# Patient Record
Sex: MALE | Race: WHITE | Employment: OTHER | URBAN - METROPOLITAN AREA
[De-identification: names, ages, dates, MRNs, and addresses within clinical notes are randomized per-mention and may not be internally consistent; named-entity substitution may affect disease eponyms.]

---

## 2019-01-21 ENCOUNTER — HOSPITAL ENCOUNTER (EMERGENCY)
Age: 69
Discharge: HOME | End: 2019-01-21
Attending: EMERGENCY MEDICINE
Payer: MEDICARE

## 2019-01-21 ENCOUNTER — APPOINTMENT (OUTPATIENT)
Dept: CT IMAGING | Age: 69
End: 2019-01-21
Payer: MEDICARE

## 2019-01-21 VITALS
DIASTOLIC BLOOD PRESSURE: 83 MMHG | TEMPERATURE: 97.6 F | HEIGHT: 72 IN | WEIGHT: 258.1 LBS | HEART RATE: 73 BPM | RESPIRATION RATE: 18 BRPM | SYSTOLIC BLOOD PRESSURE: 153 MMHG | OXYGEN SATURATION: 97 % | BODY MASS INDEX: 34.96 KG/M2

## 2019-01-21 DIAGNOSIS — R10.9 LEFT FLANK PAIN: ICD-10-CM

## 2019-01-21 DIAGNOSIS — N20.1 CALCULUS OF URETER: Primary | ICD-10-CM

## 2019-01-21 LAB
ALBUMIN SERPL-MCNC: 4.5 G/DL (ref 3.5–5.2)
ALP BLD-CCNC: 56 U/L (ref 40–129)
ALT SERPL-CCNC: 33 U/L (ref 0–40)
ANION GAP SERPL CALCULATED.3IONS-SCNC: 14 MMOL/L (ref 7–16)
AST SERPL-CCNC: 32 U/L (ref 0–39)
BASOPHILS ABSOLUTE: 0.06 E9/L (ref 0–0.2)
BASOPHILS RELATIVE PERCENT: 0.8 % (ref 0–2)
BILIRUB SERPL-MCNC: 0.5 MG/DL (ref 0–1.2)
BILIRUBIN URINE: NEGATIVE
BLOOD, URINE: NEGATIVE
BUN BLDV-MCNC: 22 MG/DL (ref 8–23)
CALCIUM SERPL-MCNC: 9.1 MG/DL (ref 8.6–10.2)
CHLORIDE BLD-SCNC: 105 MMOL/L (ref 98–107)
CLARITY: CLEAR
CO2: 20 MMOL/L (ref 22–29)
COLOR: YELLOW
CREAT SERPL-MCNC: 1.3 MG/DL (ref 0.7–1.2)
EKG ATRIAL RATE: 84 BPM
EKG P AXIS: 46 DEGREES
EKG P-R INTERVAL: 222 MS
EKG Q-T INTERVAL: 384 MS
EKG QRS DURATION: 106 MS
EKG QTC CALCULATION (BAZETT): 453 MS
EKG R AXIS: 16 DEGREES
EKG T AXIS: 39 DEGREES
EKG VENTRICULAR RATE: 84 BPM
EOSINOPHILS ABSOLUTE: 0.12 E9/L (ref 0.05–0.5)
EOSINOPHILS RELATIVE PERCENT: 1.6 % (ref 0–6)
GFR AFRICAN AMERICAN: >60
GFR NON-AFRICAN AMERICAN: 55 ML/MIN/1.73
GLUCOSE BLD-MCNC: 146 MG/DL (ref 74–99)
GLUCOSE URINE: NEGATIVE MG/DL
HCT VFR BLD CALC: 43.5 % (ref 37–54)
HEMOGLOBIN: 14.3 G/DL (ref 12.5–16.5)
IMMATURE GRANULOCYTES #: 0.04 E9/L
IMMATURE GRANULOCYTES %: 0.5 % (ref 0–5)
KETONES, URINE: NEGATIVE MG/DL
LACTIC ACID: 1.7 MMOL/L (ref 0.5–2.2)
LEUKOCYTE ESTERASE, URINE: NEGATIVE
LIPASE: 30 U/L (ref 13–60)
LYMPHOCYTES ABSOLUTE: 2.72 E9/L (ref 1.5–4)
LYMPHOCYTES RELATIVE PERCENT: 36.6 % (ref 20–42)
MCH RBC QN AUTO: 29.5 PG (ref 26–35)
MCHC RBC AUTO-ENTMCNC: 32.9 % (ref 32–34.5)
MCV RBC AUTO: 89.9 FL (ref 80–99.9)
MONOCYTES ABSOLUTE: 0.93 E9/L (ref 0.1–0.95)
MONOCYTES RELATIVE PERCENT: 12.5 % (ref 2–12)
NEUTROPHILS ABSOLUTE: 3.56 E9/L (ref 1.8–7.3)
NEUTROPHILS RELATIVE PERCENT: 48 % (ref 43–80)
NITRITE, URINE: NEGATIVE
PDW BLD-RTO: 13.8 FL (ref 11.5–15)
PH UA: 5.5 (ref 5–9)
PLATELET # BLD: 254 E9/L (ref 130–450)
PMV BLD AUTO: 8.9 FL (ref 7–12)
POTASSIUM SERPL-SCNC: 4.3 MMOL/L (ref 3.5–5)
PROTEIN UA: NEGATIVE MG/DL
RBC # BLD: 4.84 E12/L (ref 3.8–5.8)
SODIUM BLD-SCNC: 139 MMOL/L (ref 132–146)
SPECIFIC GRAVITY UA: >=1.03 (ref 1–1.03)
TOTAL PROTEIN: 7.2 G/DL (ref 6.4–8.3)
TROPONIN: <0.01 NG/ML (ref 0–0.03)
UROBILINOGEN, URINE: 0.2 E.U./DL
WBC # BLD: 7.4 E9/L (ref 4.5–11.5)

## 2019-01-21 PROCEDURE — S0028 INJECTION, FAMOTIDINE, 20 MG: HCPCS | Performed by: EMERGENCY MEDICINE

## 2019-01-21 PROCEDURE — 74176 CT ABD & PELVIS W/O CONTRAST: CPT

## 2019-01-21 PROCEDURE — 85025 COMPLETE CBC W/AUTO DIFF WBC: CPT

## 2019-01-21 PROCEDURE — 84484 ASSAY OF TROPONIN QUANT: CPT

## 2019-01-21 PROCEDURE — 80053 COMPREHEN METABOLIC PANEL: CPT

## 2019-01-21 PROCEDURE — 83605 ASSAY OF LACTIC ACID: CPT

## 2019-01-21 PROCEDURE — 99284 EMERGENCY DEPT VISIT MOD MDM: CPT

## 2019-01-21 PROCEDURE — 6370000000 HC RX 637 (ALT 250 FOR IP): Performed by: EMERGENCY MEDICINE

## 2019-01-21 PROCEDURE — 96375 TX/PRO/DX INJ NEW DRUG ADDON: CPT

## 2019-01-21 PROCEDURE — 83690 ASSAY OF LIPASE: CPT

## 2019-01-21 PROCEDURE — 96374 THER/PROPH/DIAG INJ IV PUSH: CPT

## 2019-01-21 PROCEDURE — 6360000002 HC RX W HCPCS: Performed by: EMERGENCY MEDICINE

## 2019-01-21 PROCEDURE — 2580000003 HC RX 258: Performed by: EMERGENCY MEDICINE

## 2019-01-21 PROCEDURE — 81003 URINALYSIS AUTO W/O SCOPE: CPT

## 2019-01-21 PROCEDURE — 93005 ELECTROCARDIOGRAM TRACING: CPT

## 2019-01-21 PROCEDURE — 36415 COLL VENOUS BLD VENIPUNCTURE: CPT

## 2019-01-21 RX ORDER — MORPHINE SULFATE 10 MG/ML
8 INJECTION, SOLUTION INTRAMUSCULAR; INTRAVENOUS ONCE
Status: COMPLETED | OUTPATIENT
Start: 2019-01-21 | End: 2019-01-21

## 2019-01-21 RX ORDER — ONDANSETRON 2 MG/ML
4 INJECTION INTRAMUSCULAR; INTRAVENOUS ONCE
Status: COMPLETED | OUTPATIENT
Start: 2019-01-21 | End: 2019-01-21

## 2019-01-21 RX ORDER — CLOPIDOGREL BISULFATE 75 MG/1
75 TABLET ORAL DAILY
COMMUNITY

## 2019-01-21 RX ORDER — OXYCODONE HYDROCHLORIDE AND ACETAMINOPHEN 5; 325 MG/1; MG/1
1 TABLET ORAL EVERY 6 HOURS PRN
Qty: 20 TABLET | Refills: 0 | Status: SHIPPED | OUTPATIENT
Start: 2019-01-21 | End: 2019-01-26

## 2019-01-21 RX ORDER — 0.9 % SODIUM CHLORIDE 0.9 %
500 INTRAVENOUS SOLUTION INTRAVENOUS ONCE
Status: COMPLETED | OUTPATIENT
Start: 2019-01-21 | End: 2019-01-21

## 2019-01-21 RX ORDER — TAMSULOSIN HYDROCHLORIDE 0.4 MG/1
0.4 CAPSULE ORAL DAILY
Qty: 14 CAPSULE | Refills: 0 | Status: SHIPPED | OUTPATIENT
Start: 2019-01-21 | End: 2019-03-02

## 2019-01-21 RX ORDER — OXYCODONE HYDROCHLORIDE AND ACETAMINOPHEN 5; 325 MG/1; MG/1
1 TABLET ORAL ONCE
Status: COMPLETED | OUTPATIENT
Start: 2019-01-21 | End: 2019-01-21

## 2019-01-21 RX ORDER — ONDANSETRON 4 MG/1
4 TABLET, ORALLY DISINTEGRATING ORAL EVERY 8 HOURS PRN
Qty: 10 TABLET | Refills: 0 | Status: SHIPPED | OUTPATIENT
Start: 2019-01-21 | End: 2019-03-02

## 2019-01-21 RX ORDER — KETOROLAC TROMETHAMINE 15 MG/ML
15 INJECTION, SOLUTION INTRAMUSCULAR; INTRAVENOUS ONCE
Status: COMPLETED | OUTPATIENT
Start: 2019-01-21 | End: 2019-01-21

## 2019-01-21 RX ORDER — NAPROXEN 500 MG/1
500 TABLET ORAL 2 TIMES DAILY PRN
Qty: 28 TABLET | Refills: 0 | Status: SHIPPED | OUTPATIENT
Start: 2019-01-21 | End: 2019-03-02

## 2019-01-21 RX ORDER — TAMSULOSIN HYDROCHLORIDE 0.4 MG/1
0.4 CAPSULE ORAL ONCE
Status: COMPLETED | OUTPATIENT
Start: 2019-01-21 | End: 2019-01-21

## 2019-01-21 RX ADMIN — FAMOTIDINE 20 MG: 10 INJECTION, SOLUTION INTRAVENOUS at 01:38

## 2019-01-21 RX ADMIN — OXYCODONE AND ACETAMINOPHEN 1 TABLET: 5; 325 TABLET ORAL at 03:38

## 2019-01-21 RX ADMIN — TAMSULOSIN HYDROCHLORIDE 0.4 MG: 0.4 CAPSULE ORAL at 03:38

## 2019-01-21 RX ADMIN — MORPHINE SULFATE: 10 INJECTION INTRAVENOUS at 01:36

## 2019-01-21 RX ADMIN — KETOROLAC TROMETHAMINE 15 MG: 15 INJECTION, SOLUTION INTRAMUSCULAR; INTRAVENOUS at 01:30

## 2019-01-21 RX ADMIN — ONDANSETRON 4 MG: 2 INJECTION INTRAMUSCULAR; INTRAVENOUS at 01:33

## 2019-01-21 RX ADMIN — SODIUM CHLORIDE 500 ML: 9 INJECTION, SOLUTION INTRAVENOUS at 01:33

## 2019-01-21 ASSESSMENT — PAIN DESCRIPTION - LOCATION
LOCATION: ABDOMEN
LOCATION: ABDOMEN

## 2019-01-21 ASSESSMENT — PAIN DESCRIPTION - PAIN TYPE
TYPE: ACUTE PAIN
TYPE: ACUTE PAIN

## 2019-01-21 ASSESSMENT — PAIN DESCRIPTION - ORIENTATION
ORIENTATION: MID;LOWER
ORIENTATION: MID

## 2019-01-21 ASSESSMENT — PAIN DESCRIPTION - ONSET: ONSET: AWAKENED FROM SLEEP

## 2019-01-21 ASSESSMENT — PAIN DESCRIPTION - FREQUENCY: FREQUENCY: CONTINUOUS

## 2019-01-21 ASSESSMENT — PAIN DESCRIPTION - DESCRIPTORS: DESCRIPTORS: STABBING

## 2019-01-21 ASSESSMENT — PAIN SCALES - GENERAL
PAINLEVEL_OUTOF10: 5
PAINLEVEL_OUTOF10: 10
PAINLEVEL_OUTOF10: 7
PAINLEVEL_OUTOF10: 10
PAINLEVEL_OUTOF10: 10

## 2019-03-02 ENCOUNTER — HOSPITAL ENCOUNTER (INPATIENT)
Age: 69
LOS: 1 days | Discharge: ANOTHER HOSPITAL | DRG: 313 | End: 2019-03-03
Attending: EMERGENCY MEDICINE | Admitting: INTERNAL MEDICINE
Payer: MEDICARE

## 2019-03-02 ENCOUNTER — APPOINTMENT (OUTPATIENT)
Dept: GENERAL RADIOLOGY | Age: 69
DRG: 313 | End: 2019-03-02
Payer: MEDICARE

## 2019-03-02 DIAGNOSIS — I10 HYPERTENSION, UNSPECIFIED TYPE: ICD-10-CM

## 2019-03-02 DIAGNOSIS — R07.9 CHEST PAIN, UNSPECIFIED TYPE: Primary | ICD-10-CM

## 2019-03-02 LAB
ANION GAP SERPL CALCULATED.3IONS-SCNC: 13 MMOL/L (ref 7–16)
BUN BLDV-MCNC: 27 MG/DL (ref 8–23)
CALCIUM SERPL-MCNC: 10.7 MG/DL (ref 8.6–10.2)
CHLORIDE BLD-SCNC: 106 MMOL/L (ref 98–107)
CHP ED QC CHECK: YES
CO2: 25 MMOL/L (ref 22–29)
CREAT SERPL-MCNC: 1.3 MG/DL (ref 0.7–1.2)
GFR AFRICAN AMERICAN: >60
GFR NON-AFRICAN AMERICAN: 55 ML/MIN/1.73
GLUCOSE BLD-MCNC: 101 MG/DL
GLUCOSE BLD-MCNC: 98 MG/DL (ref 74–99)
HCT VFR BLD CALC: 40.8 % (ref 37–54)
HEMOGLOBIN: 13.4 G/DL (ref 12.5–16.5)
MCH RBC QN AUTO: 29.8 PG (ref 26–35)
MCHC RBC AUTO-ENTMCNC: 32.8 % (ref 32–34.5)
MCV RBC AUTO: 90.9 FL (ref 80–99.9)
METER GLUCOSE: 101 MG/DL (ref 74–99)
PDW BLD-RTO: 13.5 FL (ref 11.5–15)
PLATELET # BLD: 252 E9/L (ref 130–450)
PMV BLD AUTO: 9 FL (ref 7–12)
POTASSIUM SERPL-SCNC: 4.2 MMOL/L (ref 3.5–5)
RBC # BLD: 4.49 E12/L (ref 3.8–5.8)
SODIUM BLD-SCNC: 144 MMOL/L (ref 132–146)
TROPONIN: 0.02 NG/ML (ref 0–0.03)
TROPONIN: 0.05 NG/ML (ref 0–0.03)
WBC # BLD: 7.1 E9/L (ref 4.5–11.5)

## 2019-03-02 PROCEDURE — 84484 ASSAY OF TROPONIN QUANT: CPT

## 2019-03-02 PROCEDURE — 71045 X-RAY EXAM CHEST 1 VIEW: CPT

## 2019-03-02 PROCEDURE — 85027 COMPLETE CBC AUTOMATED: CPT

## 2019-03-02 PROCEDURE — 6370000000 HC RX 637 (ALT 250 FOR IP): Performed by: EMERGENCY MEDICINE

## 2019-03-02 PROCEDURE — 6360000002 HC RX W HCPCS: Performed by: EMERGENCY MEDICINE

## 2019-03-02 PROCEDURE — 96372 THER/PROPH/DIAG INJ SC/IM: CPT

## 2019-03-02 PROCEDURE — 99285 EMERGENCY DEPT VISIT HI MDM: CPT

## 2019-03-02 PROCEDURE — 93005 ELECTROCARDIOGRAM TRACING: CPT | Performed by: EMERGENCY MEDICINE

## 2019-03-02 PROCEDURE — 36415 COLL VENOUS BLD VENIPUNCTURE: CPT

## 2019-03-02 PROCEDURE — 80048 BASIC METABOLIC PNL TOTAL CA: CPT

## 2019-03-02 PROCEDURE — 82962 GLUCOSE BLOOD TEST: CPT

## 2019-03-02 RX ORDER — ASPIRIN 81 MG/1
324 TABLET, CHEWABLE ORAL ONCE
Status: COMPLETED | OUTPATIENT
Start: 2019-03-02 | End: 2019-03-02

## 2019-03-02 RX ORDER — NITROGLYCERIN 0.4 MG/1
0.4 TABLET SUBLINGUAL ONCE
Status: COMPLETED | OUTPATIENT
Start: 2019-03-02 | End: 2019-03-02

## 2019-03-02 RX ORDER — GLYBURIDE 2.5 MG/1
2.5 TABLET ORAL
COMMUNITY
End: 2021-12-20

## 2019-03-02 RX ORDER — ATORVASTATIN CALCIUM 10 MG/1
10 TABLET, FILM COATED ORAL NIGHTLY
Status: DISCONTINUED | OUTPATIENT
Start: 2019-03-02 | End: 2019-03-03 | Stop reason: SDUPTHER

## 2019-03-02 RX ORDER — EZETIMIBE 10 MG/1
10 TABLET ORAL DAILY
COMMUNITY

## 2019-03-02 RX ADMIN — METFORMIN HYDROCHLORIDE 500 MG: 500 TABLET ORAL at 22:47

## 2019-03-02 RX ADMIN — NITROGLYCERIN 1 INCH: 20 OINTMENT TOPICAL at 22:14

## 2019-03-02 RX ADMIN — ENOXAPARIN SODIUM 120 MG: 120 INJECTION SUBCUTANEOUS at 23:18

## 2019-03-02 RX ADMIN — NITROGLYCERIN 0.4 MG: 0.4 TABLET SUBLINGUAL at 18:59

## 2019-03-02 RX ADMIN — ASPIRIN 81 MG 324 MG: 81 TABLET ORAL at 18:59

## 2019-03-02 RX ADMIN — ATORVASTATIN CALCIUM 10 MG: 10 TABLET, FILM COATED ORAL at 22:48

## 2019-03-02 ASSESSMENT — ENCOUNTER SYMPTOMS
ABDOMINAL PAIN: 0
COUGH: 0
SHORTNESS OF BREATH: 1
NAUSEA: 0
VOMITING: 0
WHEEZING: 0
BACK PAIN: 0
EYES NEGATIVE: 1
CHEST TIGHTNESS: 0

## 2019-03-02 ASSESSMENT — PAIN DESCRIPTION - LOCATION: LOCATION: CHEST

## 2019-03-02 ASSESSMENT — PAIN DESCRIPTION - PAIN TYPE: TYPE: ACUTE PAIN

## 2019-03-02 ASSESSMENT — PAIN SCALES - WONG BAKER: WONGBAKER_NUMERICALRESPONSE: 2

## 2019-03-02 ASSESSMENT — PAIN DESCRIPTION - DESCRIPTORS: DESCRIPTORS: HEAVINESS

## 2019-03-03 VITALS
DIASTOLIC BLOOD PRESSURE: 86 MMHG | HEART RATE: 78 BPM | WEIGHT: 255.2 LBS | HEIGHT: 72 IN | SYSTOLIC BLOOD PRESSURE: 167 MMHG | RESPIRATION RATE: 16 BRPM | OXYGEN SATURATION: 95 % | BODY MASS INDEX: 34.57 KG/M2 | TEMPERATURE: 97.7 F

## 2019-03-03 PROBLEM — R07.9 CHEST PAIN: Status: ACTIVE | Noted: 2019-03-03

## 2019-03-03 LAB
ALBUMIN SERPL-MCNC: 3.9 G/DL (ref 3.5–5.2)
ALP BLD-CCNC: 74 U/L (ref 40–129)
ALT SERPL-CCNC: 25 U/L (ref 0–40)
ANION GAP SERPL CALCULATED.3IONS-SCNC: 11 MMOL/L (ref 7–16)
AST SERPL-CCNC: 17 U/L (ref 0–39)
BASOPHILS ABSOLUTE: 0.05 E9/L (ref 0–0.2)
BASOPHILS RELATIVE PERCENT: 0.8 % (ref 0–2)
BILIRUB SERPL-MCNC: 0.3 MG/DL (ref 0–1.2)
BUN BLDV-MCNC: 23 MG/DL (ref 8–23)
CALCIUM SERPL-MCNC: 10 MG/DL (ref 8.6–10.2)
CHLORIDE BLD-SCNC: 106 MMOL/L (ref 98–107)
CO2: 22 MMOL/L (ref 22–29)
CREAT SERPL-MCNC: 1.1 MG/DL (ref 0.7–1.2)
EOSINOPHILS ABSOLUTE: 0.16 E9/L (ref 0.05–0.5)
EOSINOPHILS RELATIVE PERCENT: 2.5 % (ref 0–6)
GFR AFRICAN AMERICAN: >60
GFR NON-AFRICAN AMERICAN: >60 ML/MIN/1.73
GLUCOSE BLD-MCNC: 110 MG/DL (ref 74–99)
HCT VFR BLD CALC: 39.1 % (ref 37–54)
HEMOGLOBIN: 12.9 G/DL (ref 12.5–16.5)
IMMATURE GRANULOCYTES #: 0.03 E9/L
IMMATURE GRANULOCYTES %: 0.5 % (ref 0–5)
LYMPHOCYTES ABSOLUTE: 2.23 E9/L (ref 1.5–4)
LYMPHOCYTES RELATIVE PERCENT: 35 % (ref 20–42)
MCH RBC QN AUTO: 30.4 PG (ref 26–35)
MCHC RBC AUTO-ENTMCNC: 33 % (ref 32–34.5)
MCV RBC AUTO: 92.2 FL (ref 80–99.9)
METER GLUCOSE: 129 MG/DL (ref 74–99)
MONOCYTES ABSOLUTE: 0.68 E9/L (ref 0.1–0.95)
MONOCYTES RELATIVE PERCENT: 10.7 % (ref 2–12)
NEUTROPHILS ABSOLUTE: 3.23 E9/L (ref 1.8–7.3)
NEUTROPHILS RELATIVE PERCENT: 50.5 % (ref 43–80)
PDW BLD-RTO: 13.7 FL (ref 11.5–15)
PLATELET # BLD: 248 E9/L (ref 130–450)
PMV BLD AUTO: 9 FL (ref 7–12)
POTASSIUM SERPL-SCNC: 4.2 MMOL/L (ref 3.5–5)
RBC # BLD: 4.24 E12/L (ref 3.8–5.8)
SODIUM BLD-SCNC: 139 MMOL/L (ref 132–146)
TOTAL PROTEIN: 6.6 G/DL (ref 6.4–8.3)
TROPONIN: 0.03 NG/ML (ref 0–0.03)
WBC # BLD: 6.4 E9/L (ref 4.5–11.5)

## 2019-03-03 PROCEDURE — 36415 COLL VENOUS BLD VENIPUNCTURE: CPT

## 2019-03-03 PROCEDURE — 80053 COMPREHEN METABOLIC PANEL: CPT

## 2019-03-03 PROCEDURE — 82962 GLUCOSE BLOOD TEST: CPT

## 2019-03-03 PROCEDURE — 85025 COMPLETE CBC W/AUTO DIFF WBC: CPT

## 2019-03-03 PROCEDURE — 84484 ASSAY OF TROPONIN QUANT: CPT

## 2019-03-03 PROCEDURE — 1200000000 HC SEMI PRIVATE

## 2019-03-03 RX ORDER — ATORVASTATIN CALCIUM 40 MG/1
80 TABLET, FILM COATED ORAL NIGHTLY
Status: DISCONTINUED | OUTPATIENT
Start: 2019-03-03 | End: 2019-03-03 | Stop reason: HOSPADM

## 2019-03-03 RX ORDER — METOPROLOL SUCCINATE 50 MG/1
50 TABLET, EXTENDED RELEASE ORAL DAILY
Status: DISCONTINUED | OUTPATIENT
Start: 2019-03-03 | End: 2019-03-03 | Stop reason: HOSPADM

## 2019-03-03 RX ORDER — EZETIMIBE 10 MG/1
10 TABLET ORAL DAILY
Status: DISCONTINUED | OUTPATIENT
Start: 2019-03-03 | End: 2019-03-03 | Stop reason: HOSPADM

## 2019-03-03 RX ORDER — LISINOPRIL 10 MG/1
10 TABLET ORAL DAILY
Status: DISCONTINUED | OUTPATIENT
Start: 2019-03-03 | End: 2019-03-03 | Stop reason: HOSPADM

## 2019-03-03 RX ORDER — ASCORBIC ACID 500 MG
1000 TABLET ORAL DAILY
Status: DISCONTINUED | OUTPATIENT
Start: 2019-03-03 | End: 2019-03-03 | Stop reason: HOSPADM

## 2019-03-03 RX ORDER — ISOSORBIDE MONONITRATE 30 MG/1
45 TABLET, EXTENDED RELEASE ORAL DAILY
Status: DISCONTINUED | OUTPATIENT
Start: 2019-03-03 | End: 2019-03-03 | Stop reason: HOSPADM

## 2019-03-03 RX ORDER — AMOXICILLIN 500 MG
1 CAPSULE ORAL DAILY
Status: DISCONTINUED | OUTPATIENT
Start: 2019-03-03 | End: 2019-03-03 | Stop reason: CLARIF

## 2019-03-03 RX ORDER — CLOPIDOGREL BISULFATE 75 MG/1
75 TABLET ORAL DAILY
Status: DISCONTINUED | OUTPATIENT
Start: 2019-03-03 | End: 2019-03-03 | Stop reason: HOSPADM

## 2019-03-03 RX ORDER — ASPIRIN 81 MG/1
81 TABLET, CHEWABLE ORAL NIGHTLY
Status: DISCONTINUED | OUTPATIENT
Start: 2019-03-03 | End: 2019-03-03 | Stop reason: HOSPADM

## 2019-03-05 LAB
EKG ATRIAL RATE: 84 BPM
EKG P AXIS: 57 DEGREES
EKG P-R INTERVAL: 210 MS
EKG Q-T INTERVAL: 392 MS
EKG QRS DURATION: 118 MS
EKG QTC CALCULATION (BAZETT): 463 MS
EKG R AXIS: 19 DEGREES
EKG T AXIS: 54 DEGREES
EKG VENTRICULAR RATE: 84 BPM

## 2019-03-17 ASSESSMENT — ENCOUNTER SYMPTOMS
RESPIRATORY NEGATIVE: 1
EYES NEGATIVE: 1
GASTROINTESTINAL NEGATIVE: 1

## 2019-07-05 ENCOUNTER — HOSPITAL ENCOUNTER (EMERGENCY)
Age: 69
Discharge: HOME | End: 2019-07-05
Attending: EMERGENCY MEDICINE
Payer: MEDICARE

## 2019-07-05 ENCOUNTER — APPOINTMENT (OUTPATIENT)
Dept: CT IMAGING | Age: 69
End: 2019-07-05
Payer: MEDICARE

## 2019-07-05 VITALS
DIASTOLIC BLOOD PRESSURE: 86 MMHG | OXYGEN SATURATION: 96 % | WEIGHT: 250 LBS | BODY MASS INDEX: 33.86 KG/M2 | HEART RATE: 65 BPM | RESPIRATION RATE: 22 BRPM | SYSTOLIC BLOOD PRESSURE: 188 MMHG | TEMPERATURE: 97.8 F | HEIGHT: 72 IN

## 2019-07-05 DIAGNOSIS — N20.0 KIDNEY STONE: Primary | ICD-10-CM

## 2019-07-05 LAB
ALBUMIN SERPL-MCNC: 4.8 G/DL (ref 3.5–5.2)
ALP BLD-CCNC: 70 U/L (ref 40–129)
ALT SERPL-CCNC: 39 U/L (ref 0–40)
ANION GAP SERPL CALCULATED.3IONS-SCNC: 15 MMOL/L (ref 7–16)
AST SERPL-CCNC: 30 U/L (ref 0–39)
BACTERIA: ABNORMAL /HPF
BASOPHILS ABSOLUTE: 0.05 E9/L (ref 0–0.2)
BASOPHILS RELATIVE PERCENT: 0.7 % (ref 0–2)
BILIRUB SERPL-MCNC: 0.5 MG/DL (ref 0–1.2)
BILIRUBIN URINE: NEGATIVE
BLOOD, URINE: ABNORMAL
BUN BLDV-MCNC: 20 MG/DL (ref 8–23)
CALCIUM SERPL-MCNC: 10.1 MG/DL (ref 8.6–10.2)
CASTS: ABNORMAL /LPF
CHLORIDE BLD-SCNC: 106 MMOL/L (ref 98–107)
CLARITY: CLEAR
CO2: 23 MMOL/L (ref 22–29)
COLOR: YELLOW
CREAT SERPL-MCNC: 1.4 MG/DL (ref 0.7–1.2)
CRYSTALS, UA: ABNORMAL
EOSINOPHILS ABSOLUTE: 0.12 E9/L (ref 0.05–0.5)
EOSINOPHILS RELATIVE PERCENT: 1.7 % (ref 0–6)
EPITHELIAL CELLS, UA: ABNORMAL /HPF
GFR AFRICAN AMERICAN: >60
GFR NON-AFRICAN AMERICAN: 50 ML/MIN/1.73
GLUCOSE BLD-MCNC: 145 MG/DL (ref 74–99)
GLUCOSE URINE: NEGATIVE MG/DL
HCT VFR BLD CALC: 44.9 % (ref 37–54)
HEMOGLOBIN: 14.6 G/DL (ref 12.5–16.5)
IMMATURE GRANULOCYTES #: 0.03 E9/L
IMMATURE GRANULOCYTES %: 0.4 % (ref 0–5)
KETONES, URINE: NEGATIVE MG/DL
LACTIC ACID: 2.5 MMOL/L (ref 0.5–2.2)
LEUKOCYTE ESTERASE, URINE: NEGATIVE
LIPASE: 20 U/L (ref 13–60)
LYMPHOCYTES ABSOLUTE: 3.01 E9/L (ref 1.5–4)
LYMPHOCYTES RELATIVE PERCENT: 42.9 % (ref 20–42)
MCH RBC QN AUTO: 29.6 PG (ref 26–35)
MCHC RBC AUTO-ENTMCNC: 32.5 % (ref 32–34.5)
MCV RBC AUTO: 91.1 FL (ref 80–99.9)
MONOCYTES ABSOLUTE: 0.73 E9/L (ref 0.1–0.95)
MONOCYTES RELATIVE PERCENT: 10.4 % (ref 2–12)
NEUTROPHILS ABSOLUTE: 3.08 E9/L (ref 1.8–7.3)
NEUTROPHILS RELATIVE PERCENT: 43.9 % (ref 43–80)
NITRITE, URINE: NEGATIVE
PDW BLD-RTO: 13.3 FL (ref 11.5–15)
PH UA: 5.5 (ref 5–9)
PLATELET # BLD: 259 E9/L (ref 130–450)
PMV BLD AUTO: 8.9 FL (ref 7–12)
POTASSIUM SERPL-SCNC: 4.3 MMOL/L (ref 3.5–5)
PROTEIN UA: ABNORMAL MG/DL
RBC # BLD: 4.93 E12/L (ref 3.8–5.8)
RBC UA: >20 /HPF (ref 0–2)
SODIUM BLD-SCNC: 144 MMOL/L (ref 132–146)
SPECIFIC GRAVITY UA: 1.02 (ref 1–1.03)
TOTAL PROTEIN: 7.4 G/DL (ref 6.4–8.3)
UROBILINOGEN, URINE: 0.2 E.U./DL
WBC # BLD: 7 E9/L (ref 4.5–11.5)
WBC UA: ABNORMAL /HPF (ref 0–5)

## 2019-07-05 PROCEDURE — 83690 ASSAY OF LIPASE: CPT

## 2019-07-05 PROCEDURE — 74176 CT ABD & PELVIS W/O CONTRAST: CPT

## 2019-07-05 PROCEDURE — 96374 THER/PROPH/DIAG INJ IV PUSH: CPT

## 2019-07-05 PROCEDURE — 81001 URINALYSIS AUTO W/SCOPE: CPT

## 2019-07-05 PROCEDURE — 2580000003 HC RX 258: Performed by: EMERGENCY MEDICINE

## 2019-07-05 PROCEDURE — 96375 TX/PRO/DX INJ NEW DRUG ADDON: CPT

## 2019-07-05 PROCEDURE — 6360000002 HC RX W HCPCS: Performed by: EMERGENCY MEDICINE

## 2019-07-05 PROCEDURE — 99284 EMERGENCY DEPT VISIT MOD MDM: CPT

## 2019-07-05 PROCEDURE — 80053 COMPREHEN METABOLIC PANEL: CPT

## 2019-07-05 PROCEDURE — 51701 INSERT BLADDER CATHETER: CPT

## 2019-07-05 PROCEDURE — 85025 COMPLETE CBC W/AUTO DIFF WBC: CPT

## 2019-07-05 PROCEDURE — 36415 COLL VENOUS BLD VENIPUNCTURE: CPT

## 2019-07-05 PROCEDURE — 83605 ASSAY OF LACTIC ACID: CPT

## 2019-07-05 RX ORDER — 0.9 % SODIUM CHLORIDE 0.9 %
1000 INTRAVENOUS SOLUTION INTRAVENOUS ONCE
Status: COMPLETED | OUTPATIENT
Start: 2019-07-05 | End: 2019-07-05

## 2019-07-05 RX ORDER — KETOROLAC TROMETHAMINE 15 MG/ML
15 INJECTION, SOLUTION INTRAMUSCULAR; INTRAVENOUS ONCE
Status: COMPLETED | OUTPATIENT
Start: 2019-07-05 | End: 2019-07-05

## 2019-07-05 RX ORDER — HYDROCODONE BITARTRATE AND ACETAMINOPHEN 5; 325 MG/1; MG/1
1 TABLET ORAL EVERY 6 HOURS PRN
Qty: 12 TABLET | Refills: 0 | Status: SHIPPED | OUTPATIENT
Start: 2019-07-05 | End: 2019-07-08

## 2019-07-05 RX ORDER — TAMSULOSIN HYDROCHLORIDE 0.4 MG/1
0.4 CAPSULE ORAL DAILY
Qty: 10 CAPSULE | Refills: 0 | Status: SHIPPED | OUTPATIENT
Start: 2019-07-05 | End: 2021-12-20

## 2019-07-05 RX ORDER — ONDANSETRON 2 MG/ML
4 INJECTION INTRAMUSCULAR; INTRAVENOUS ONCE
Status: COMPLETED | OUTPATIENT
Start: 2019-07-05 | End: 2019-07-05

## 2019-07-05 RX ORDER — ONDANSETRON 4 MG/1
4 TABLET, ORALLY DISINTEGRATING ORAL EVERY 8 HOURS PRN
Qty: 10 TABLET | Refills: 0 | Status: SHIPPED | OUTPATIENT
Start: 2019-07-05 | End: 2020-07-04

## 2019-07-05 RX ADMIN — ONDANSETRON 4 MG: 2 INJECTION INTRAMUSCULAR; INTRAVENOUS at 03:58

## 2019-07-05 RX ADMIN — KETOROLAC TROMETHAMINE 15 MG: 15 INJECTION, SOLUTION INTRAMUSCULAR; INTRAVENOUS at 03:57

## 2019-07-05 RX ADMIN — SODIUM CHLORIDE 1000 ML: 9 INJECTION, SOLUTION INTRAVENOUS at 03:56

## 2019-07-05 ASSESSMENT — ENCOUNTER SYMPTOMS
COUGH: 0
DIARRHEA: 0
NAUSEA: 1
EYE DISCHARGE: 0
ABDOMINAL PAIN: 1
BACK PAIN: 0
SHORTNESS OF BREATH: 0
VOMITING: 1
SINUS PRESSURE: 0
EYE PAIN: 0
SORE THROAT: 0
WHEEZING: 0
EYE REDNESS: 0

## 2019-07-05 ASSESSMENT — PAIN SCALES - GENERAL
PAINLEVEL_OUTOF10: 2
PAINLEVEL_OUTOF10: 10
PAINLEVEL_OUTOF10: 10

## 2019-07-05 ASSESSMENT — PAIN DESCRIPTION - LOCATION: LOCATION: FLANK

## 2019-07-05 ASSESSMENT — PAIN DESCRIPTION - ORIENTATION: ORIENTATION: MID

## 2019-07-05 ASSESSMENT — PAIN DESCRIPTION - PAIN TYPE: TYPE: ACUTE PAIN

## 2019-07-05 NOTE — ED NOTES
Discharge, medications, and follow-up instructions reviewed with patient. Pt questions/concerns answered. Pt verbalized understanding.         Nicole Allred RN  07/05/19 0260

## 2021-12-20 ENCOUNTER — TELEPHONE (OUTPATIENT)
Dept: CARDIAC REHAB | Age: 71
End: 2021-12-20

## 2021-12-20 ENCOUNTER — HOSPITAL ENCOUNTER (OUTPATIENT)
Dept: CARDIAC REHAB | Age: 71
Discharge: HOME | End: 2021-12-20

## 2021-12-20 VITALS
OXYGEN SATURATION: 96 % | DIASTOLIC BLOOD PRESSURE: 76 MMHG | HEART RATE: 81 BPM | WEIGHT: 255 LBS | BODY MASS INDEX: 34.54 KG/M2 | RESPIRATION RATE: 20 BRPM | SYSTOLIC BLOOD PRESSURE: 148 MMHG | HEIGHT: 72 IN

## 2021-12-20 RX ORDER — AMLODIPINE BESYLATE 5 MG/1
5 TABLET ORAL DAILY
COMMUNITY

## 2021-12-20 ASSESSMENT — PATIENT HEALTH QUESTIONNAIRE - PHQ9
SUM OF ALL RESPONSES TO PHQ QUESTIONS 1-9: 1
SUM OF ALL RESPONSES TO PHQ QUESTIONS 1-9: 1

## 2021-12-20 NOTE — TELEPHONE ENCOUNTER
Medication list verified with patient during initial cardiac rehab evaluation. List provided to RN. Updated in epic system.          Anthony Bryant RN

## 2025-05-13 ENCOUNTER — APPOINTMENT (OUTPATIENT)
Dept: GENERAL RADIOLOGY | Age: 75
DRG: 322 | End: 2025-05-13
Payer: MEDICARE

## 2025-05-13 ENCOUNTER — HOSPITAL ENCOUNTER (INPATIENT)
Age: 75
LOS: 3 days | Discharge: HOME | DRG: 322 | End: 2025-05-16
Attending: EMERGENCY MEDICINE | Admitting: INTERNAL MEDICINE
Payer: MEDICARE

## 2025-05-13 DIAGNOSIS — I21.3 ST ELEVATION MYOCARDIAL INFARCTION (STEMI), UNSPECIFIED ARTERY (HCC): Primary | ICD-10-CM

## 2025-05-13 DIAGNOSIS — I10 HYPERTENSION, UNSPECIFIED TYPE: ICD-10-CM

## 2025-05-13 DIAGNOSIS — I24.9 ACUTE CORONARY SYNDROME (HCC): ICD-10-CM

## 2025-05-13 DIAGNOSIS — I21.3 STEMI (ST ELEVATION MYOCARDIAL INFARCTION) (HCC): ICD-10-CM

## 2025-05-13 LAB
25(OH)D3 SERPL-MCNC: 21.3 NG/ML (ref 30–100)
ACTIVATED CLOTTING TIME, LOW RANGE: 366 SEC
ALBUMIN SERPL-MCNC: 3.8 G/DL (ref 3.5–5.2)
ALP SERPL-CCNC: 71 U/L (ref 40–129)
ALT SERPL-CCNC: 28 U/L (ref 0–40)
ANION GAP SERPL CALCULATED.3IONS-SCNC: 12 MMOL/L (ref 7–16)
ANION GAP SERPL CALCULATED.3IONS-SCNC: 16 MMOL/L (ref 7–16)
AST SERPL-CCNC: 20 U/L (ref 0–39)
BASOPHILS # BLD: 0.03 K/UL (ref 0–0.2)
BASOPHILS NFR BLD: 0 % (ref 0–2)
BILIRUB SERPL-MCNC: 0.5 MG/DL (ref 0–1.2)
BILIRUB UR QL STRIP: NEGATIVE
BUN SERPL-MCNC: 25 MG/DL (ref 6–23)
BUN SERPL-MCNC: 27 MG/DL (ref 6–23)
CALCIUM SERPL-MCNC: 11.4 MG/DL (ref 8.6–10.2)
CALCIUM SERPL-MCNC: 12.3 MG/DL (ref 8.6–10.2)
CHLORIDE SERPL-SCNC: 102 MMOL/L (ref 98–107)
CHLORIDE SERPL-SCNC: 104 MMOL/L (ref 98–107)
CLARITY UR: CLEAR
CO2 SERPL-SCNC: 18 MMOL/L (ref 22–29)
CO2 SERPL-SCNC: 21 MMOL/L (ref 22–29)
COLOR UR: YELLOW
CREAT SERPL-MCNC: 1.4 MG/DL (ref 0.7–1.2)
CREAT SERPL-MCNC: 1.4 MG/DL (ref 0.7–1.2)
EKG ATRIAL RATE: 77 BPM
EKG ATRIAL RATE: 86 BPM
EKG ATRIAL RATE: 93 BPM
EKG P AXIS: 50 DEGREES
EKG P AXIS: 59 DEGREES
EKG P AXIS: 61 DEGREES
EKG P-R INTERVAL: 230 MS
EKG P-R INTERVAL: 236 MS
EKG P-R INTERVAL: 246 MS
EKG Q-T INTERVAL: 338 MS
EKG Q-T INTERVAL: 362 MS
EKG Q-T INTERVAL: 374 MS
EKG QRS DURATION: 104 MS
EKG QRS DURATION: 114 MS
EKG QRS DURATION: 98 MS
EKG QTC CALCULATION (BAZETT): 409 MS
EKG QTC CALCULATION (BAZETT): 420 MS
EKG QTC CALCULATION (BAZETT): 447 MS
EKG R AXIS: 15 DEGREES
EKG R AXIS: 2 DEGREES
EKG R AXIS: 26 DEGREES
EKG T AXIS: 76 DEGREES
EKG T AXIS: 92 DEGREES
EKG T AXIS: 96 DEGREES
EKG VENTRICULAR RATE: 77 BPM
EKG VENTRICULAR RATE: 86 BPM
EKG VENTRICULAR RATE: 93 BPM
EOSINOPHIL # BLD: 0.09 K/UL (ref 0.05–0.5)
EOSINOPHILS RELATIVE PERCENT: 1 % (ref 0–6)
ERYTHROCYTE [DISTWIDTH] IN BLOOD BY AUTOMATED COUNT: 13.9 % (ref 11.5–15)
GFR, ESTIMATED: 52 ML/MIN/1.73M2
GFR, ESTIMATED: 54 ML/MIN/1.73M2
GLUCOSE BLD-MCNC: 115 MG/DL (ref 74–99)
GLUCOSE BLD-MCNC: 136 MG/DL (ref 74–99)
GLUCOSE SERPL-MCNC: 117 MG/DL (ref 74–99)
GLUCOSE SERPL-MCNC: 182 MG/DL (ref 74–99)
GLUCOSE UR STRIP-MCNC: NEGATIVE MG/DL
HCT VFR BLD AUTO: 39.6 % (ref 37–54)
HGB BLD-MCNC: 13.3 G/DL (ref 12.5–16.5)
HGB UR QL STRIP.AUTO: ABNORMAL
IMM GRANULOCYTES # BLD AUTO: 0.04 K/UL (ref 0–0.58)
IMM GRANULOCYTES NFR BLD: 1 % (ref 0–5)
KETONES UR STRIP-MCNC: NEGATIVE MG/DL
LEUKOCYTE ESTERASE UR QL STRIP: NEGATIVE
LYMPHOCYTES NFR BLD: 2.38 K/UL (ref 1.5–4)
LYMPHOCYTES RELATIVE PERCENT: 35 % (ref 20–42)
MCH RBC QN AUTO: 29.6 PG (ref 26–35)
MCHC RBC AUTO-ENTMCNC: 33.6 G/DL (ref 32–34.5)
MCV RBC AUTO: 88.2 FL (ref 80–99.9)
MONOCYTES NFR BLD: 0.75 K/UL (ref 0.1–0.95)
MONOCYTES NFR BLD: 11 % (ref 2–12)
NEUTROPHILS NFR BLD: 51 % (ref 43–80)
NEUTS SEG NFR BLD: 3.48 K/UL (ref 1.8–7.3)
NITRITE UR QL STRIP: NEGATIVE
PH UR STRIP: 5.5 [PH] (ref 5–8)
PLATELET # BLD AUTO: 251 K/UL (ref 130–450)
PMV BLD AUTO: 8.9 FL (ref 7–12)
POTASSIUM SERPL-SCNC: 4 MMOL/L (ref 3.5–5)
POTASSIUM SERPL-SCNC: 4.5 MMOL/L (ref 3.5–5)
PROT SERPL-MCNC: 6.2 G/DL (ref 6.4–8.3)
PROT UR STRIP-MCNC: NEGATIVE MG/DL
PTH-INTACT SERPL-MCNC: 10 PG/ML (ref 15–65)
RBC # BLD AUTO: 4.49 M/UL (ref 3.8–5.8)
RBC #/AREA URNS HPF: ABNORMAL /HPF
SODIUM SERPL-SCNC: 136 MMOL/L (ref 132–146)
SODIUM SERPL-SCNC: 137 MMOL/L (ref 132–146)
SP GR UR STRIP: 1.01 (ref 1–1.03)
TROPONIN I SERPL HS-MCNC: 19 NG/L (ref 0–22)
UROBILINOGEN UR STRIP-ACNC: 0.2 EU/DL (ref 0–1)
WBC #/AREA URNS HPF: ABNORMAL /HPF
WBC OTHER # BLD: 6.8 K/UL (ref 4.5–11.5)

## 2025-05-13 PROCEDURE — 83970 ASSAY OF PARATHORMONE: CPT

## 2025-05-13 PROCEDURE — 82306 VITAMIN D 25 HYDROXY: CPT

## 2025-05-13 PROCEDURE — 6370000000 HC RX 637 (ALT 250 FOR IP): Performed by: INTERNAL MEDICINE

## 2025-05-13 PROCEDURE — C1760 CLOSURE DEV, VASC: HCPCS | Performed by: INTERNAL MEDICINE

## 2025-05-13 PROCEDURE — 6360000004 HC RX CONTRAST MEDICATION: Performed by: INTERNAL MEDICINE

## 2025-05-13 PROCEDURE — C1769 GUIDE WIRE: HCPCS | Performed by: INTERNAL MEDICINE

## 2025-05-13 PROCEDURE — 87081 CULTURE SCREEN ONLY: CPT

## 2025-05-13 PROCEDURE — B2111ZZ FLUOROSCOPY OF MULTIPLE CORONARY ARTERIES USING LOW OSMOLAR CONTRAST: ICD-10-PCS | Performed by: INTERNAL MEDICINE

## 2025-05-13 PROCEDURE — 93005 ELECTROCARDIOGRAM TRACING: CPT | Performed by: INTERNAL MEDICINE

## 2025-05-13 PROCEDURE — 99285 EMERGENCY DEPT VISIT HI MDM: CPT

## 2025-05-13 PROCEDURE — B2151ZZ FLUOROSCOPY OF LEFT HEART USING LOW OSMOLAR CONTRAST: ICD-10-PCS | Performed by: INTERNAL MEDICINE

## 2025-05-13 PROCEDURE — 6360000002 HC RX W HCPCS: Performed by: INTERNAL MEDICINE

## 2025-05-13 PROCEDURE — 99291 CRITICAL CARE FIRST HOUR: CPT | Performed by: INTERNAL MEDICINE

## 2025-05-13 PROCEDURE — 2580000003 HC RX 258: Performed by: INTERNAL MEDICINE

## 2025-05-13 PROCEDURE — 85347 COAGULATION TIME ACTIVATED: CPT

## 2025-05-13 PROCEDURE — 99153 MOD SED SAME PHYS/QHP EA: CPT | Performed by: INTERNAL MEDICINE

## 2025-05-13 PROCEDURE — 99152 MOD SED SAME PHYS/QHP 5/>YRS: CPT | Performed by: INTERNAL MEDICINE

## 2025-05-13 PROCEDURE — C1725 CATH, TRANSLUMIN NON-LASER: HCPCS | Performed by: INTERNAL MEDICINE

## 2025-05-13 PROCEDURE — 027034Z DILATION OF CORONARY ARTERY, ONE ARTERY WITH DRUG-ELUTING INTRALUMINAL DEVICE, PERCUTANEOUS APPROACH: ICD-10-PCS | Performed by: INTERNAL MEDICINE

## 2025-05-13 PROCEDURE — 93010 ELECTROCARDIOGRAM REPORT: CPT | Performed by: INTERNAL MEDICINE

## 2025-05-13 PROCEDURE — 93005 ELECTROCARDIOGRAM TRACING: CPT | Performed by: EMERGENCY MEDICINE

## 2025-05-13 PROCEDURE — C1887 CATHETER, GUIDING: HCPCS | Performed by: INTERNAL MEDICINE

## 2025-05-13 PROCEDURE — 2709999900 HC NON-CHARGEABLE SUPPLY: Performed by: INTERNAL MEDICINE

## 2025-05-13 PROCEDURE — C1874 STENT, COATED/COV W/DEL SYS: HCPCS | Performed by: INTERNAL MEDICINE

## 2025-05-13 PROCEDURE — 92921 HC PRQ CARDIAC ANGIO ADDL ART: CPT | Performed by: INTERNAL MEDICINE

## 2025-05-13 PROCEDURE — C1894 INTRO/SHEATH, NON-LASER: HCPCS | Performed by: INTERNAL MEDICINE

## 2025-05-13 PROCEDURE — 4A023N7 MEASUREMENT OF CARDIAC SAMPLING AND PRESSURE, LEFT HEART, PERCUTANEOUS APPROACH: ICD-10-PCS | Performed by: INTERNAL MEDICINE

## 2025-05-13 PROCEDURE — 93459 L HRT ART/GRFT ANGIO: CPT | Performed by: INTERNAL MEDICINE

## 2025-05-13 PROCEDURE — 80048 BASIC METABOLIC PNL TOTAL CA: CPT

## 2025-05-13 PROCEDURE — 84484 ASSAY OF TROPONIN QUANT: CPT

## 2025-05-13 PROCEDURE — 71045 X-RAY EXAM CHEST 1 VIEW: CPT

## 2025-05-13 PROCEDURE — 80053 COMPREHEN METABOLIC PANEL: CPT

## 2025-05-13 PROCEDURE — 85025 COMPLETE CBC W/AUTO DIFF WBC: CPT

## 2025-05-13 PROCEDURE — 82962 GLUCOSE BLOOD TEST: CPT

## 2025-05-13 PROCEDURE — 81001 URINALYSIS AUTO W/SCOPE: CPT

## 2025-05-13 PROCEDURE — 2000000000 HC ICU R&B

## 2025-05-13 DEVICE — STENT ONYXNG22538UX ONYX 2.25X38RX
Type: IMPLANTABLE DEVICE | Status: FUNCTIONAL
Brand: ONYX FRONTIER™

## 2025-05-13 RX ORDER — ONDANSETRON 2 MG/ML
INJECTION INTRAMUSCULAR; INTRAVENOUS PRN
Status: DISCONTINUED | OUTPATIENT
Start: 2025-05-13 | End: 2025-05-13 | Stop reason: HOSPADM

## 2025-05-13 RX ORDER — ISOSORBIDE MONONITRATE 60 MG/1
60 TABLET, EXTENDED RELEASE ORAL 2 TIMES DAILY
Status: DISCONTINUED | OUTPATIENT
Start: 2025-05-13 | End: 2025-05-16 | Stop reason: HOSPADM

## 2025-05-13 RX ORDER — IOPAMIDOL 755 MG/ML
INJECTION, SOLUTION INTRAVASCULAR PRN
Status: DISCONTINUED | OUTPATIENT
Start: 2025-05-13 | End: 2025-05-13 | Stop reason: HOSPADM

## 2025-05-13 RX ORDER — LISINOPRIL 5 MG/1
5 TABLET ORAL DAILY
Status: DISCONTINUED | OUTPATIENT
Start: 2025-05-13 | End: 2025-05-13 | Stop reason: CLARIF

## 2025-05-13 RX ORDER — METOPROLOL SUCCINATE 50 MG/1
50 TABLET, EXTENDED RELEASE ORAL DAILY
Status: DISCONTINUED | OUTPATIENT
Start: 2025-05-13 | End: 2025-05-16 | Stop reason: HOSPADM

## 2025-05-13 RX ORDER — ISOSORBIDE MONONITRATE 60 MG/1
60 TABLET, EXTENDED RELEASE ORAL DAILY
Status: DISCONTINUED | OUTPATIENT
Start: 2025-05-13 | End: 2025-05-13 | Stop reason: CLARIF

## 2025-05-13 RX ORDER — MORPHINE SULFATE 2 MG/ML
2 INJECTION, SOLUTION INTRAMUSCULAR; INTRAVENOUS ONCE
Status: COMPLETED | OUTPATIENT
Start: 2025-05-13 | End: 2025-05-13

## 2025-05-13 RX ORDER — NITROGLYCERIN 20 MG/100ML
5-200 INJECTION INTRAVENOUS CONTINUOUS
Status: DISCONTINUED | OUTPATIENT
Start: 2025-05-13 | End: 2025-05-14

## 2025-05-13 RX ORDER — ASPIRIN 81 MG/1
81 TABLET ORAL NIGHTLY
Status: DISCONTINUED | OUTPATIENT
Start: 2025-05-13 | End: 2025-05-16 | Stop reason: HOSPADM

## 2025-05-13 RX ORDER — INSULIN LISPRO 100 [IU]/ML
0-4 INJECTION, SOLUTION INTRAVENOUS; SUBCUTANEOUS
Status: DISCONTINUED | OUTPATIENT
Start: 2025-05-13 | End: 2025-05-16 | Stop reason: HOSPADM

## 2025-05-13 RX ORDER — SODIUM CHLORIDE 9 MG/ML
INJECTION, SOLUTION INTRAVENOUS CONTINUOUS
Status: ACTIVE | OUTPATIENT
Start: 2025-05-13 | End: 2025-05-13

## 2025-05-13 RX ORDER — LISINOPRIL 5 MG/1
10 TABLET ORAL 2 TIMES DAILY
Status: DISCONTINUED | OUTPATIENT
Start: 2025-05-13 | End: 2025-05-14

## 2025-05-13 RX ORDER — POTASSIUM CHLORIDE 7.45 MG/ML
10 INJECTION INTRAVENOUS PRN
Status: DISCONTINUED | OUTPATIENT
Start: 2025-05-13 | End: 2025-05-16 | Stop reason: HOSPADM

## 2025-05-13 RX ORDER — AMLODIPINE BESYLATE 5 MG/1
5 TABLET ORAL DAILY
Status: DISCONTINUED | OUTPATIENT
Start: 2025-05-13 | End: 2025-05-16 | Stop reason: HOSPADM

## 2025-05-13 RX ORDER — FENTANYL CITRATE 50 UG/ML
INJECTION, SOLUTION INTRAMUSCULAR; INTRAVENOUS PRN
Status: DISCONTINUED | OUTPATIENT
Start: 2025-05-13 | End: 2025-05-13 | Stop reason: HOSPADM

## 2025-05-13 RX ORDER — LISINOPRIL 10 MG/1
10 TABLET ORAL 2 TIMES DAILY
Status: ON HOLD | COMMUNITY
End: 2025-05-16 | Stop reason: HOSPADM

## 2025-05-13 RX ORDER — EZETIMIBE 10 MG/1
10 TABLET ORAL DAILY
Status: DISCONTINUED | OUTPATIENT
Start: 2025-05-13 | End: 2025-05-16 | Stop reason: HOSPADM

## 2025-05-13 RX ORDER — GLUCAGON 1 MG/ML
1 KIT INJECTION PRN
Status: DISCONTINUED | OUTPATIENT
Start: 2025-05-13 | End: 2025-05-16 | Stop reason: HOSPADM

## 2025-05-13 RX ORDER — POTASSIUM CHLORIDE 1500 MG/1
40 TABLET, EXTENDED RELEASE ORAL PRN
Status: DISCONTINUED | OUTPATIENT
Start: 2025-05-13 | End: 2025-05-16 | Stop reason: HOSPADM

## 2025-05-13 RX ORDER — MAGNESIUM SULFATE IN WATER 40 MG/ML
2000 INJECTION, SOLUTION INTRAVENOUS PRN
Status: DISCONTINUED | OUTPATIENT
Start: 2025-05-13 | End: 2025-05-16 | Stop reason: HOSPADM

## 2025-05-13 RX ORDER — CLOPIDOGREL BISULFATE 75 MG/1
75 TABLET ORAL DAILY
Status: DISCONTINUED | OUTPATIENT
Start: 2025-05-13 | End: 2025-05-16 | Stop reason: HOSPADM

## 2025-05-13 RX ORDER — ATORVASTATIN CALCIUM 40 MG/1
80 TABLET, FILM COATED ORAL DAILY
Status: DISCONTINUED | OUTPATIENT
Start: 2025-05-13 | End: 2025-05-16 | Stop reason: HOSPADM

## 2025-05-13 RX ORDER — HEPARIN SODIUM 10000 [USP'U]/ML
INJECTION, SOLUTION INTRAVENOUS; SUBCUTANEOUS PRN
Status: DISCONTINUED | OUTPATIENT
Start: 2025-05-13 | End: 2025-05-13 | Stop reason: HOSPADM

## 2025-05-13 RX ORDER — MIDAZOLAM HYDROCHLORIDE 1 MG/ML
INJECTION, SOLUTION INTRAMUSCULAR; INTRAVENOUS PRN
Status: DISCONTINUED | OUTPATIENT
Start: 2025-05-13 | End: 2025-05-13 | Stop reason: HOSPADM

## 2025-05-13 RX ORDER — DEXTROSE MONOHYDRATE 100 MG/ML
INJECTION, SOLUTION INTRAVENOUS CONTINUOUS PRN
Status: DISCONTINUED | OUTPATIENT
Start: 2025-05-13 | End: 2025-05-16 | Stop reason: HOSPADM

## 2025-05-13 RX ORDER — NITROGLYCERIN 20 MG/100ML
INJECTION INTRAVENOUS CONTINUOUS PRN
Status: COMPLETED | OUTPATIENT
Start: 2025-05-13 | End: 2025-05-13

## 2025-05-13 RX ORDER — ISOSORBIDE MONONITRATE 60 MG/1
60 TABLET, EXTENDED RELEASE ORAL 2 TIMES DAILY
COMMUNITY

## 2025-05-13 RX ADMIN — EZETIMIBE 10 MG: 10 TABLET ORAL at 17:29

## 2025-05-13 RX ADMIN — METOPROLOL SUCCINATE 50 MG: 50 TABLET, EXTENDED RELEASE ORAL at 13:58

## 2025-05-13 RX ADMIN — MORPHINE SULFATE 2 MG: 2 INJECTION, SOLUTION INTRAMUSCULAR; INTRAVENOUS at 14:42

## 2025-05-13 RX ADMIN — ATORVASTATIN CALCIUM 80 MG: 40 TABLET, FILM COATED ORAL at 13:57

## 2025-05-13 RX ADMIN — NITROGLYCERIN 10 MCG/MIN: 20 INJECTION INTRAVENOUS at 14:49

## 2025-05-13 RX ADMIN — SODIUM CHLORIDE: 0.9 INJECTION, SOLUTION INTRAVENOUS at 13:39

## 2025-05-13 RX ADMIN — SODIUM CHLORIDE: 0.9 INJECTION, SOLUTION INTRAVENOUS at 16:06

## 2025-05-13 RX ADMIN — ASPIRIN 81 MG: 81 TABLET, COATED ORAL at 20:28

## 2025-05-13 RX ADMIN — AMLODIPINE BESYLATE 5 MG: 5 TABLET ORAL at 13:58

## 2025-05-13 RX ADMIN — CLOPIDOGREL BISULFATE 75 MG: 75 TABLET, FILM COATED ORAL at 13:57

## 2025-05-13 ASSESSMENT — LIFESTYLE VARIABLES
HOW MANY STANDARD DRINKS CONTAINING ALCOHOL DO YOU HAVE ON A TYPICAL DAY: PATIENT DOES NOT DRINK
HOW OFTEN DO YOU HAVE A DRINK CONTAINING ALCOHOL: NEVER

## 2025-05-13 ASSESSMENT — PAIN SCALES - GENERAL: PAINLEVEL_OUTOF10: 4

## 2025-05-13 ASSESSMENT — PAIN DESCRIPTION - DESCRIPTORS: DESCRIPTORS: DULL;HEAVINESS;PRESSURE

## 2025-05-13 ASSESSMENT — PAIN DESCRIPTION - LOCATION: LOCATION: CHEST

## 2025-05-13 ASSESSMENT — PAIN DESCRIPTION - ORIENTATION: ORIENTATION: MID

## 2025-05-13 NOTE — ACP (ADVANCE CARE PLANNING)
Advance Care Planning   Healthcare Decision Maker:    Primary Decision Maker: TorresHector - Child - [phone]    Primary Decision Maker: Dc Torres - Child - [phone]    Primary Decision Maker: Ignacio Torres - Child - [phone]    Primary Decision Maker: TorresAlysia miller - Child - [phone]    Today we documented Decision Maker(s) consistent with Legal Next of Kin hierarchy.       Electronically signed by Milena Lund RN-BC on 5/13/2025 at 12:57 PM

## 2025-05-13 NOTE — ED PROVIDER NOTES
[facility] SPECIAL PROCEDURES  EMERGENCY DEPARTMENT ENCOUNTER        Pt Name: Joshua Torres  MRN: [id number]  Birthdate 1950  Date of evaluation: 5/13/2025  Provider: Yosi Bustillo DO  PCP: Arleen Tarango MD  Note Started: 10:14 AM EDT 5/13/25    CHIEF COMPLAINT       Chief Complaint   Patient presents with    Chest Pain     STEMI ALERT CALLED AT 1003       HISTORY OF PRESENT ILLNESS: 1 or more Elements   History From: patient    Limitations to history : None    Joshua Torres is a 75 y.o. male who presents to the ED for evaluation of chest pain.  States the pain started last night.  Is in the center of his chest.  Describes as a pressure/tightness.  Prior history of heart disease with two-vessel bypass.  Patient states that he did get some relief with the nitroglycerin that was given by EMS.  He was also given fentanyl which helped.  EMS also gave him 4 baby aspirin.  Denies any associate dizziness, lightness, or syncope.  Is not on anticoagulants.  Patient is on dual platelet therapy with aspirin and Plavix.  Patient's medical history is significant for diabetes, hypercholesteremia, and high blood pressure as well as CAD    Nursing Notes were all reviewed and agreed with or any disagreements were addressed in the HPI.      REVIEW OF EXTERNAL NOTE :        REVIEW OF SYSTEMS :           Positives and Pertinent negatives as per HPI.     SURGICAL HISTORY     Past Surgical History:   Procedure Laterality Date    CORONARY ANGIOPLASTY WITH STENT PLACEMENT      2.5 x 12 mm AVE BMS RCA    CORONARY ARTERY BYPASS GRAFT  08/2001    LIMA-LAD, left radial-OM, SVG-diagonal, SVG-PDA.([facility])    DIAGNOSTIC CARDIAC CATH LAB PROCEDURE  7/1999, 1/2000, 6/2013    KNEE ARTHROSCOPY         CURRENTMEDICATIONS       Current Discharge Medication List        CONTINUE these medications which have NOT CHANGED    Details   VITAMIN D, CHOLECALCIFEROL, PO Take by mouth      amLODIPine (NORVASC) 5 MG tablet Take 5

## 2025-05-13 NOTE — DISCHARGE INSTRUCTIONS
Cardiac Rehabilitation: Discharge instructions        Cardiac rehabilitation is a program for people who have a heart problem, such as a heart attack, coronary stent placed, heart failure, or a heart valve disease. The program includes exercise, lifestyle changes, education, and emotional support. Cardiac rehab can help you improve the quality of your life through better overall health. It can help you lose weight and feel better about yourself.    On your cardiac rehab team, you may have your doctor, a nurse specialist, an exercise physiologist, and a dietitian. They will design your cardiac rehab program specifically for you. You will learn how to reduce your risk for heart problems, how to manage stress, and how to eat a heart-healthy diet. By the end of the program, you will be ready to maintain a healthier lifestyle on your own.    Follow-up care is a key part of your treatment and safety. Be sure to make and go to all appointments, and call your doctor if you are having problems. It's also a good idea to know your test results and keep a list of the medicines you take.    Please call to schedule your first appointment once you have been cleared by your cardiologist to attend Phase II Outpatient Cardiac Rehabilitation.       Cardiac Rehabilitation Options:  [facility]  Cardiac Rehab  [address], [place]                                                                                                   P- [phone]                                                                                           Hours: M/W/F 6am-5pm T/Th 7-3    [facility] Cardiac Rehab             [facility]  [address]                                                                                          Cardiology Services  [place], [place]

## 2025-05-13 NOTE — H&P
MCHC 33.6 05/13/2025 10:15 AM    RDW 13.9 05/13/2025 10:15 AM    LYMPHOPCT 35 05/13/2025 10:15 AM    MONOPCT 11 05/13/2025 10:15 AM    EOSPCT 1 05/13/2025 10:15 AM    BASOPCT 0 05/13/2025 10:15 AM    MONOSABS 0.75 05/13/2025 10:15 AM    LYMPHSABS 2.38 05/13/2025 10:15 AM    EOSABS 0.09 05/13/2025 10:15 AM    BASOSABS 0.03 05/13/2025 10:15 AM     BMP:    Lab Results   Component Value Date/Time     05/13/2025 10:15 AM    K 4.0 05/13/2025 10:15 AM     05/13/2025 10:15 AM    CO2 18 05/13/2025 10:15 AM    BUN 27 05/13/2025 10:15 AM    CREATININE 1.4 05/13/2025 10:15 AM    CALCIUM 12.3 05/13/2025 10:15 AM    GFRAA >60 07/05/2019 03:17 AM    LABGLOM 52 05/13/2025 10:15 AM    GLUCOSE 182 05/13/2025 10:15 AM     Lab Results   Component Value Date/Time    TROPHS 19 05/13/2025 10:15 AM     ASSESSMENT:  Anterior/lateral ST elevated myocardial infarction status post cardiac catheterization with stent to the left circumflex May 13, 2025 by Dr. Cunningham  Extensive coronary artery disease history with prior coronary artery bypass grafting x 4, PCI with stent placement x 2 prior to this event  Hypercalcemia with underlying CKD stage III  Non-insulin-dependent diabetes mellitus type 2  Dyslipidemia  Essential hypertension  Peripheral arterial disease  Reformed smoker    PLAN:  Joshua Torres presented with STEMI as above and underwent urgent cardiac catheterization with stent placement.  Postprocedural medications have been reconciled by the interventional list.  He is evaluated in the ICU as above.  Site looks unremarkable.  He is feeling much better overall and is chest pain-free.  Postprocedural management as per protocol [place] as per the interventional list.  Baseline renal insufficiency is being addressed by withholding potentially nephrotoxic agents and providing IV fluids postcontrast load.  Hypercalcemia noted on initial laboratory evaluation and this will be rechecked following the procedure, and hypercalcemia

## 2025-05-13 NOTE — CARE COORDINATION
Case Management Assessment  Initial Evaluation    Date/Time of Evaluation: 5/13/2025 1:10 PM  Assessment Completed by: Milena Lund RN    If patient is discharged prior to next notation, then this note serves as note for discharge by case management.    Patient Name: Joshua Torres                   YOB: 1950  Diagnosis: STEMI (ST elevation myocardial infarction) (HCC) [I21.3]  ST elevation myocardial infarction (STEMI), unspecified artery (HCC) [I21.3]                   Date / Time: 5/13/2025  9:54 AM    Patient Admission Status: Inpatient   Readmission Risk (Low < 19, Mod (19-27), High > 27): Readmission Risk Score: 10.3    Current PCP: Arleen Tarango MD  PCP verified by CM? Yes    Chart Reviewed: Yes      History Provided by: Patient  Patient Orientation: Alert and Oriented, Person, Place, Situation, Self    Patient Cognition:      Hospitalization in the last 30 days (Readmission):  No      Advance Directives:      Code Status: Prior   Patient's Primary Decision Maker is: Legal Next of Kin    Primary Decision Maker: Hector Torres  Child - [phone]    Primary Decision Maker: MelissaDc Child - [phone]    Primary Decision Maker: Ignacio Torres  Child - [phone]    Primary Decision Maker: Alysia Torres  Child - [phone]    Discharge Planning:    Patient lives with:   Type of Home:    Primary Care Giver: Self  Patient Support Systems include: Spouse/Significant Other, Children, Family Members     Current services prior to admission:              Current DME:  none            Type of Home Care services:  none     ADLS  Prior functional level: Independent in ADLs/IADLs  Current functional level: Independent in ADLs/IADLs    Family can provide assistance at DC: Yes  Would you like Case Management to discuss the discharge plan with any other family members/significant others, and if so, who? No  Plans to Return to Present Housing: Yes  Potential Assistance needed at discharge:

## 2025-05-13 NOTE — ED NOTES
Pt arrived via EMS. ECG performed on EMS cot. EMS gave 4mg zofran, fentanyl, 324 ASA, 3ea 0.4 SL nitro prior to arrival.

## 2025-05-14 ENCOUNTER — APPOINTMENT (OUTPATIENT)
Dept: GENERAL RADIOLOGY | Age: 75
DRG: 322 | End: 2025-05-14
Payer: MEDICARE

## 2025-05-14 ENCOUNTER — APPOINTMENT (OUTPATIENT)
Age: 75
DRG: 322 | End: 2025-05-14
Payer: MEDICARE

## 2025-05-14 LAB
ANION GAP SERPL CALCULATED.3IONS-SCNC: 11 MMOL/L (ref 7–16)
BASOPHILS # BLD: 0.05 K/UL (ref 0–0.2)
BASOPHILS NFR BLD: 1 % (ref 0–2)
BUN SERPL-MCNC: 19 MG/DL (ref 6–23)
CA-I BLD-SCNC: 1.37 MMOL/L (ref 1.15–1.33)
CALCIUM SERPL-MCNC: 10 MG/DL (ref 8.6–10.2)
CHLORIDE SERPL-SCNC: 105 MMOL/L (ref 98–107)
CHOLEST SERPL-MCNC: 95 MG/DL
CO2 SERPL-SCNC: 21 MMOL/L (ref 22–29)
CREAT SERPL-MCNC: 1.4 MG/DL (ref 0.7–1.2)
ECHO BSA: 2.47 M2
ECHO LV EDV A2C: 104 ML
ECHO LV EDV A4C: 149 ML
ECHO LV EDV BP: 125 ML (ref 67–155)
ECHO LV EDV INDEX A4C: 62 ML/M2
ECHO LV EDV INDEX BP: 52 ML/M2
ECHO LV EDV NDEX A2C: 43 ML/M2
ECHO LV EF PHYSICIAN: 61 %
ECHO LV EJECTION FRACTION A2C: 71 %
ECHO LV EJECTION FRACTION A4C: 55 %
ECHO LV EJECTION FRACTION BIPLANE: 61 % (ref 55–100)
ECHO LV ESV A2C: 30 ML
ECHO LV ESV A4C: 67 ML
ECHO LV ESV BP: 49 ML (ref 22–58)
ECHO LV ESV INDEX A2C: 13 ML/M2
ECHO LV ESV INDEX A4C: 28 ML/M2
ECHO LV ESV INDEX BP: 20 ML/M2
ECHO LV FRACTIONAL SHORTENING: 26 % (ref 28–44)
ECHO LV INTERNAL DIMENSION DIASTOLE INDEX: 1.58 CM/M2
ECHO LV INTERNAL DIMENSION DIASTOLIC: 3.8 CM (ref 4.2–5.9)
ECHO LV INTERNAL DIMENSION SYSTOLIC INDEX: 1.17 CM/M2
ECHO LV INTERNAL DIMENSION SYSTOLIC: 2.8 CM
ECHO LV ISOVOLUMETRIC RELAXATION TIME (IVRT): 87.5 MS
ECHO LV IVSD: 1.3 CM (ref 0.6–1)
ECHO LV MASS 2D: 183.4 G (ref 88–224)
ECHO LV MASS INDEX 2D: 76.4 G/M2 (ref 49–115)
ECHO LV POSTERIOR WALL DIASTOLIC: 1.4 CM (ref 0.6–1)
ECHO LV RELATIVE WALL THICKNESS RATIO: 0.74
ECHO MV "A" WAVE DURATION: 156 MSEC
ECHO MV A VELOCITY: 0.7 M/S
ECHO MV AREA PHT: 5.1 CM2
ECHO MV E DECELERATION TIME (DT): 229.3 MS
ECHO MV E VELOCITY: 0.64 M/S
ECHO MV E/A RATIO: 0.91
ECHO MV MAX VELOCITY: 0.9 M/S
ECHO MV MEAN GRADIENT: 1 MMHG
ECHO MV MEAN VELOCITY: 0.5 M/S
ECHO MV PEAK GRADIENT: 3 MMHG
ECHO MV PRESSURE HALF TIME (PHT): 43.5 MS
ECHO MV VTI: 19.4 CM
ECHO RV INTERNAL DIMENSION: 2 CM
ECHO TV REGURGITANT MAX VELOCITY: 1.5 M/S
ECHO TV REGURGITANT PEAK GRADIENT: 9 MMHG
EOSINOPHIL # BLD: 0.12 K/UL (ref 0.05–0.5)
EOSINOPHILS RELATIVE PERCENT: 2 % (ref 0–6)
ERYTHROCYTE [DISTWIDTH] IN BLOOD BY AUTOMATED COUNT: 14 % (ref 11.5–15)
GFR, ESTIMATED: 55 ML/MIN/1.73M2
GLUCOSE BLD-MCNC: 111 MG/DL (ref 74–99)
GLUCOSE BLD-MCNC: 113 MG/DL (ref 74–99)
GLUCOSE BLD-MCNC: 119 MG/DL (ref 74–99)
GLUCOSE BLD-MCNC: 122 MG/DL (ref 74–99)
GLUCOSE SERPL-MCNC: 120 MG/DL (ref 74–99)
HBA1C MFR BLD: 6.6 % (ref 4–5.6)
HCT VFR BLD AUTO: 35.8 % (ref 37–54)
HDLC SERPL-MCNC: 28 MG/DL
HGB BLD-MCNC: 12 G/DL (ref 12.5–16.5)
IMM GRANULOCYTES # BLD AUTO: 0.04 K/UL (ref 0–0.58)
IMM GRANULOCYTES NFR BLD: 1 % (ref 0–5)
LDLC SERPL CALC-MCNC: 34 MG/DL
LYMPHOCYTES NFR BLD: 1.64 K/UL (ref 1.5–4)
LYMPHOCYTES RELATIVE PERCENT: 22 % (ref 20–42)
MAGNESIUM SERPL-MCNC: 1.5 MG/DL (ref 1.6–2.6)
MCH RBC QN AUTO: 30.1 PG (ref 26–35)
MCHC RBC AUTO-ENTMCNC: 33.5 G/DL (ref 32–34.5)
MCV RBC AUTO: 89.7 FL (ref 80–99.9)
MONOCYTES NFR BLD: 0.72 K/UL (ref 0.1–0.95)
MONOCYTES NFR BLD: 10 % (ref 2–12)
NEUTROPHILS NFR BLD: 66 % (ref 43–80)
NEUTS SEG NFR BLD: 4.87 K/UL (ref 1.8–7.3)
PHOSPHATE SERPL-MCNC: 3.9 MG/DL (ref 2.5–4.5)
PLATELET # BLD AUTO: 208 K/UL (ref 130–450)
PMV BLD AUTO: 8.8 FL (ref 7–12)
POTASSIUM SERPL-SCNC: 4.7 MMOL/L (ref 3.5–5)
RBC # BLD AUTO: 3.99 M/UL (ref 3.8–5.8)
SODIUM SERPL-SCNC: 137 MMOL/L (ref 132–146)
T4 FREE SERPL-MCNC: 1.1 NG/DL (ref 0.9–1.7)
TRIGL SERPL-MCNC: 164 MG/DL
TROPONIN I SERPL HS-MCNC: 135 NG/L (ref 0–22)
TROPONIN I SERPL HS-MCNC: 137 NG/L (ref 0–22)
TSH SERPL DL<=0.05 MIU/L-ACNC: 1.62 UIU/ML (ref 0.27–4.2)
VLDLC SERPL CALC-MCNC: 33 MG/DL
WBC OTHER # BLD: 7.4 K/UL (ref 4.5–11.5)

## 2025-05-14 PROCEDURE — 82330 ASSAY OF CALCIUM: CPT

## 2025-05-14 PROCEDURE — 85025 COMPLETE CBC W/AUTO DIFF WBC: CPT

## 2025-05-14 PROCEDURE — 2060000000 HC ICU INTERMEDIATE R&B

## 2025-05-14 PROCEDURE — 80061 LIPID PANEL: CPT

## 2025-05-14 PROCEDURE — 83735 ASSAY OF MAGNESIUM: CPT

## 2025-05-14 PROCEDURE — 82043 UR ALBUMIN QUANTITATIVE: CPT

## 2025-05-14 PROCEDURE — 71045 X-RAY EXAM CHEST 1 VIEW: CPT

## 2025-05-14 PROCEDURE — 82962 GLUCOSE BLOOD TEST: CPT

## 2025-05-14 PROCEDURE — 84439 ASSAY OF FREE THYROXINE: CPT

## 2025-05-14 PROCEDURE — 93321 DOPPLER ECHO F-UP/LMTD STD: CPT

## 2025-05-14 PROCEDURE — 6360000002 HC RX W HCPCS: Performed by: INTERNAL MEDICINE

## 2025-05-14 PROCEDURE — 6360000002 HC RX W HCPCS

## 2025-05-14 PROCEDURE — 93005 ELECTROCARDIOGRAM TRACING: CPT | Performed by: INTERNAL MEDICINE

## 2025-05-14 PROCEDURE — 84484 ASSAY OF TROPONIN QUANT: CPT

## 2025-05-14 PROCEDURE — 80048 BASIC METABOLIC PNL TOTAL CA: CPT

## 2025-05-14 PROCEDURE — 82570 ASSAY OF URINE CREATININE: CPT

## 2025-05-14 PROCEDURE — 83036 HEMOGLOBIN GLYCOSYLATED A1C: CPT

## 2025-05-14 PROCEDURE — 84100 ASSAY OF PHOSPHORUS: CPT

## 2025-05-14 PROCEDURE — 84156 ASSAY OF PROTEIN URINE: CPT

## 2025-05-14 PROCEDURE — 2500000003 HC RX 250 WO HCPCS: Performed by: INTERNAL MEDICINE

## 2025-05-14 PROCEDURE — 84443 ASSAY THYROID STIM HORMONE: CPT

## 2025-05-14 PROCEDURE — 6370000000 HC RX 637 (ALT 250 FOR IP): Performed by: INTERNAL MEDICINE

## 2025-05-14 RX ORDER — FUROSEMIDE 10 MG/ML
80 INJECTION INTRAMUSCULAR; INTRAVENOUS ONCE
Status: COMPLETED | OUTPATIENT
Start: 2025-05-14 | End: 2025-05-14

## 2025-05-14 RX ORDER — LISINOPRIL 5 MG/1
10 TABLET ORAL 2 TIMES DAILY
Status: DISCONTINUED | OUTPATIENT
Start: 2025-05-14 | End: 2025-05-14 | Stop reason: SDUPTHER

## 2025-05-14 RX ORDER — ACETAMINOPHEN 325 MG/1
650 TABLET ORAL EVERY 4 HOURS PRN
Status: DISCONTINUED | OUTPATIENT
Start: 2025-05-14 | End: 2025-05-16 | Stop reason: HOSPADM

## 2025-05-14 RX ORDER — SODIUM CHLORIDE 9 MG/ML
INJECTION, SOLUTION INTRAVENOUS PRN
Status: DISCONTINUED | OUTPATIENT
Start: 2025-05-14 | End: 2025-05-16 | Stop reason: HOSPADM

## 2025-05-14 RX ORDER — AMLODIPINE BESYLATE 5 MG/1
5 TABLET ORAL ONCE
Status: COMPLETED | OUTPATIENT
Start: 2025-05-14 | End: 2025-05-14

## 2025-05-14 RX ORDER — LISINOPRIL 10 MG/1
10 TABLET ORAL 2 TIMES DAILY
Status: DISCONTINUED | OUTPATIENT
Start: 2025-05-14 | End: 2025-05-16 | Stop reason: HOSPADM

## 2025-05-14 RX ORDER — MAGNESIUM SULFATE IN WATER 40 MG/ML
4000 INJECTION, SOLUTION INTRAVENOUS ONCE
Status: COMPLETED | OUTPATIENT
Start: 2025-05-14 | End: 2025-05-14

## 2025-05-14 RX ORDER — SODIUM CHLORIDE 0.9 % (FLUSH) 0.9 %
5-40 SYRINGE (ML) INJECTION PRN
Status: DISCONTINUED | OUTPATIENT
Start: 2025-05-14 | End: 2025-05-16 | Stop reason: HOSPADM

## 2025-05-14 RX ORDER — SODIUM CHLORIDE 0.9 % (FLUSH) 0.9 %
5-40 SYRINGE (ML) INJECTION EVERY 12 HOURS SCHEDULED
Status: DISCONTINUED | OUTPATIENT
Start: 2025-05-14 | End: 2025-05-16 | Stop reason: HOSPADM

## 2025-05-14 RX ADMIN — EZETIMIBE 10 MG: 10 TABLET ORAL at 09:55

## 2025-05-14 RX ADMIN — AMLODIPINE BESYLATE 5 MG: 5 TABLET ORAL at 09:53

## 2025-05-14 RX ADMIN — METOPROLOL SUCCINATE 50 MG: 50 TABLET, EXTENDED RELEASE ORAL at 09:54

## 2025-05-14 RX ADMIN — CLOPIDOGREL BISULFATE 75 MG: 75 TABLET, FILM COATED ORAL at 09:53

## 2025-05-14 RX ADMIN — LISINOPRIL 10 MG: 10 TABLET ORAL at 21:00

## 2025-05-14 RX ADMIN — ISOSORBIDE MONONITRATE 60 MG: 60 TABLET, EXTENDED RELEASE ORAL at 21:00

## 2025-05-14 RX ADMIN — AMLODIPINE BESYLATE 5 MG: 5 TABLET ORAL at 12:08

## 2025-05-14 RX ADMIN — ASPIRIN 81 MG: 81 TABLET, COATED ORAL at 21:00

## 2025-05-14 RX ADMIN — ISOSORBIDE MONONITRATE 60 MG: 60 TABLET, EXTENDED RELEASE ORAL at 12:07

## 2025-05-14 RX ADMIN — SODIUM CHLORIDE, PRESERVATIVE FREE 10 ML: 5 INJECTION INTRAVENOUS at 21:15

## 2025-05-14 RX ADMIN — FUROSEMIDE 80 MG: 10 INJECTION, SOLUTION INTRAMUSCULAR; INTRAVENOUS at 07:41

## 2025-05-14 RX ADMIN — LISINOPRIL 10 MG: 10 TABLET ORAL at 12:08

## 2025-05-14 RX ADMIN — ATORVASTATIN CALCIUM 80 MG: 40 TABLET, FILM COATED ORAL at 09:54

## 2025-05-14 RX ADMIN — MAGNESIUM SULFATE HEPTAHYDRATE 4000 MG: 40 INJECTION, SOLUTION INTRAVENOUS at 07:05

## 2025-05-14 ASSESSMENT — PAIN SCALES - GENERAL: PAINLEVEL_OUTOF10: 0

## 2025-05-14 NOTE — PLAN OF CARE
Problem: Chronic Conditions and Co-morbidities  Goal: Patient's chronic conditions and co-morbidity symptoms are monitored and maintained or improved  Outcome: Progressing  Flowsheets (Taken 5/13/2025 1200 by Yosi Headley RN)  Care Plan - Patient's Chronic Conditions and Co-Morbidity Symptoms are Monitored and Maintained or Improved: Monitor and assess patient's chronic conditions and comorbid symptoms for stability, deterioration, or improvement     Problem: Discharge Planning  Goal: Discharge to home or other facility with appropriate resources  Outcome: Progressing     Problem: Safety - Adult  Goal: Free from fall injury  Outcome: Progressing     Problem: ABCDS Injury Assessment  Goal: Absence of physical injury  Outcome: Progressing

## 2025-05-14 NOTE — CARE COORDINATION
5/14/2025 ICU, Cardiac cath with stent- STEMI. The catheterization showed severe distal circumflex stenosis of 80% which was supplying the collaterals to the chronically occluded RCA.This was treated with a drug-eluting stent. Lasix and Magnesium IV, Nitro gtt is off. No Transfer to [facility]. Pt has no DME at home, lives alone. 4 children legal decision making. His family will transport to home. Electronically signed by JAYLEN Salas on 5/14/2025 at 12:49 PM

## 2025-05-14 NOTE — CONSULTS
Assessment and Plan  Patient is a 75 y.o. male with the following medical Problems:   Lateral ST elevation MI ( S/P PCI to  distal left circumflex)  Coronary artery disease with prior CABG  Type 2 diabetes.  CKD stage III  Hypercalcemia  Hypertension  Dyslipidemia  Morbid obesity  Former nicotine abuse  Probable obstructive sleep apnea.    Plan of care:  Continue with dual antiplatelet therapy.  Patient is chronically on metoprolol and lisinopril.  Lisinopril was held due to CKD and currently patient is on amlodipine.  Will discuss with primary team ACE inhibitors and beta-blockers in the setting of coronary artery disease and type 2 diabetes.  Continue with statin.  Heart healthy diet.  Patient is obese and likely has obstructive sleep apnea.  He would benefit from sleep study and possibly positive pressure ventilation while sleeping, napping, and as needed.  Follow-up cardiology recommendations.  Echocardiography.  Maintenance IV fluid for hypercalcemia and CKD.  PTH    History of Present Illness:   Patient is a 75-year-old man with above-mentioned medical problems who presented with chest pain.  EKG was suggestive of a STEMI and patient underwent PCI with stent to the left circumflex.  Patient was admitted to the ICU post procedure for monitoring.  Chest pain has resolved post PCI.    Past Medical History:  Past Medical History:   Diagnosis Date    CAD (coronary artery disease)     Diabetes mellitus (HCC)     Hx of cardiac cath 07/1999    PCI of the RCA    Hyperlipidemia     Hypertension       Past Surgical History:   Procedure Laterality Date    CORONARY ANGIOPLASTY WITH STENT PLACEMENT      2.5 x 12 mm AVE BMS RCA    CORONARY ARTERY BYPASS GRAFT  08/2001    LIMA-LAD, left radial-OM, SVG-diagonal, SVG-PDA.([facility])    DIAGNOSTIC CARDIAC CATH LAB PROCEDURE  7/1999, 1/2000, 6/2013    KNEE ARTHROSCOPY         Family History:   Family History   Problem Relation Age of Onset    High Blood Pressure Mother     High 
Met with patient and discussed that their physician has ordered a referral to our outpatient Phase II Cardiac Rehabilitation program. Reviewed the benefits of cardiac rehabilitation based on their diagnosis and personal risk factors. Patient demonstrates strong interest in Cardiac Rehabilitation at this time.  Cardiac Rehabilitation brochure provided to patient/family. The Cardiac Rehabilitation Program has been provided the patient's referral information and pertinent patient details and history. The patient may call [facility] Cardiac Rehabilitation at [phone] for additional information or questions. Contact information for [facility] Cardiac Rehabilitation and other choices close to the patient's residence have been provided in the discharge instructions so that the patient may call and schedule an appointment when cleared by their physician. Thank you for the referral.  
Met with patient and discussed that their physician has ordered a referral to our outpatient Phase II Cardiac Rehabilitation program. Reviewed the benefits of cardiac rehabilitation based on their diagnosis and personal risk factors. Patient demonstrates strong interest in Cardiac Rehabilitation at this time.  Cardiac Rehabilitation brochure provided to patient/family. The Cardiac Rehabilitation Program has been provided the patient's referral information and pertinent patient details and history. The patient may call [facility] Cardiac Rehabilitation at [phone] for additional information or questions. Contact information for [facility] Cardiac Rehabilitation and other choices close to the patient's residence have been provided in the discharge instructions so that the patient may call and schedule an appointment when cleared by their physician. Thank you for the referral.  
Today's Date: 5/13/2025  Patient Name: Joshua Torres  Date of admission: 5/13/2025  9:54 AM  Patient's age: 75 y.o., 1950male    Admission Dx: STEMI (ST elevation myocardial infarction) (HCC) [I21.3]  ST elevation myocardial infarction (STEMI), unspecified artery (HCC) [I21.3]    Requesting Physician: Yehuda Cunningham MD    Chief Complaint   Patient presents with    Chest Pain     STEMI ALERT CALLED AT 1003       HISTORY OF PRESENT ILLNESS:      75-year-old gentleman and has known coronary artery disease and previous four-vessel coronary bypass surgery done at the [facility] many years ago, he also had subsequently a stent of his proximal circumflex artery a few years ago and has been doing fairly well, presented to the emergency room secondary to severe substernal chest heaviness that started yesterday and persisted until today.  He described that as heavy pressure across his chest 8/10.  It was associated with shortness of breath and nausea.  His EKG in the emergency room showed sinus rhythm with mild ST elevation in leads V1 and V2.  STEMI alert was called from the emergency room.  He appears to be in moderate distress  Continues to describe persistent heaviness in his chest.      REVIEW OF SYSTEMS:    A comprehensive review of systems is negative except for what is reported above    Past Medical History:   has a past medical history of CAD (coronary artery disease), Diabetes mellitus (HCC), Hx of cardiac cath, Hyperlipidemia, and Hypertension.    Past Surgical History:   has a past surgical history that includes Coronary angioplasty with stent; Coronary artery bypass graft (08/2001); Diagnostic Cardiac Cath Lab Procedure (7/1999, 1/2000, 6/2013); and Knee arthroscopy.     Social History:   reports that he quit smoking about 34 years ago. His smoking use included cigarettes. He started smoking about 69 years ago. He has a 35 pack-year smoking history. His smokeless tobacco use includes snuff. He reports 
05/13/25  1015 05/13/25  1427 05/14/25  0343    137 137   K 4.0 4.5 4.7    104 105   CO2 18* 21* 21*   BUN 27* 25* 19   CREATININE 1.4* 1.4* 1.4*   GLUCOSE 182* 117* 120*             Labs:  CBC with Differential:    Lab Results   Component Value Date/Time    WBC 7.4 05/14/2025 03:43 AM    RBC 3.99 05/14/2025 03:43 AM    HGB 12.0 05/14/2025 03:43 AM    HCT 35.8 05/14/2025 03:43 AM     05/14/2025 03:43 AM    MCV 89.7 05/14/2025 03:43 AM    MCH 30.1 05/14/2025 03:43 AM    MCHC 33.5 05/14/2025 03:43 AM    RDW 14.0 05/14/2025 03:43 AM    LYMPHOPCT 22 05/14/2025 03:43 AM    MONOPCT 10 05/14/2025 03:43 AM    EOSPCT 2 05/14/2025 03:43 AM    BASOPCT 1 05/14/2025 03:43 AM    MONOSABS 0.72 05/14/2025 03:43 AM    LYMPHSABS 1.64 05/14/2025 03:43 AM    EOSABS 0.12 05/14/2025 03:43 AM    BASOSABS 0.05 05/14/2025 03:43 AM     BMP:    Lab Results   Component Value Date/Time     05/14/2025 03:43 AM    K 4.7 05/14/2025 03:43 AM     05/14/2025 03:43 AM    CO2 21 05/14/2025 03:43 AM    BUN 19 05/14/2025 03:43 AM    CREATININE 1.4 05/14/2025 03:43 AM    CALCIUM 10.0 05/14/2025 03:43 AM    GFRAA >60 07/05/2019 03:17 AM    LABGLOM 55 05/14/2025 03:43 AM    GLUCOSE 120 05/14/2025 03:43 AM     Albumin:  No results found for: \"LABALBU\"  Ionized Calcium:  No components found for: \"IONCA\"  Magnesium:    Lab Results   Component Value Date/Time    MG 1.5 05/14/2025 03:43 AM     Phosphorus:    Lab Results   Component Value Date/Time    PHOS 3.9 05/14/2025 03:43 AM     LDH:  No results found for: \"LDH\"  Uric Acid:  No results found for: \"LABURIC\", \"URICACID\"  Troponin:    Lab Results   Component Value Date/Time    TROPONINI 0.03 03/03/2019 04:18 AM     U/A:    Lab Results   Component Value Date/Time    COLORU Yellow 05/13/2025 01:32 PM    PROTEINU NEGATIVE 05/13/2025 01:32 PM    PHUR 5.5 05/13/2025 01:32 PM    PHUR 5.5 07/05/2019 05:20 AM    LABCAST FEW 07/05/2019 05:20 AM    WBCUA 0 TO 5 05/13/2025 01:32 PM    RBCUA

## 2025-05-14 NOTE — PLAN OF CARE
Problem: Chronic Conditions and Co-morbidities  Goal: Patient's chronic conditions and co-morbidity symptoms are monitored and maintained or improved  5/14/2025 0852 by Yosi Headley RN  Outcome: Progressing  5/13/2025 2219 by Judy Dietrich RN  Outcome: Progressing  Flowsheets (Taken 5/13/2025 1200 by Yosi Headley RN)  Care Plan - Patient's Chronic Conditions and Co-Morbidity Symptoms are Monitored and Maintained or Improved: Monitor and assess patient's chronic conditions and comorbid symptoms for stability, deterioration, or improvement     Problem: Discharge Planning  Goal: Discharge to home or other facility with appropriate resources  5/14/2025 0852 by Yosi Headley RN  Outcome: Progressing  5/13/2025 2219 by Judy Dietrich RN  Outcome: Progressing     Problem: Safety - Adult  Goal: Free from fall injury  5/14/2025 0852 by Yosi Headley RN  Outcome: Progressing  5/13/2025 2219 by Judy Dietrich RN  Outcome: Progressing     Problem: ABCDS Injury Assessment  Goal: Absence of physical injury  5/14/2025 0852 by Yosi Headley RN  Outcome: Progressing  5/13/2025 2219 by Judy Dietrich RN  Outcome: Progressing

## 2025-05-15 ENCOUNTER — APPOINTMENT (OUTPATIENT)
Dept: ULTRASOUND IMAGING | Age: 75
DRG: 322 | End: 2025-05-15
Payer: MEDICARE

## 2025-05-15 LAB
25(OH)D3 SERPL-MCNC: 26.8 NG/ML (ref 30–100)
ANION GAP SERPL CALCULATED.3IONS-SCNC: 13 MMOL/L (ref 7–16)
BASOPHILS # BLD: 0.04 K/UL (ref 0–0.2)
BASOPHILS NFR BLD: 1 % (ref 0–2)
BUN SERPL-MCNC: 29 MG/DL (ref 6–23)
CA-I BLD-SCNC: 1.29 MMOL/L (ref 1.15–1.33)
CALCIUM SERPL-MCNC: 9.9 MG/DL (ref 8.6–10.2)
CHLORIDE SERPL-SCNC: 101 MMOL/L (ref 98–107)
CO2 SERPL-SCNC: 23 MMOL/L (ref 22–29)
CREAT SERPL-MCNC: 1.8 MG/DL (ref 0.7–1.2)
CREAT UR-MCNC: 234 MG/DL (ref 40–278)
CREAT UR-MCNC: 246.3 MG/DL (ref 40–278)
EKG ATRIAL RATE: 75 BPM
EKG ATRIAL RATE: 75 BPM
EKG P AXIS: 12 DEGREES
EKG P AXIS: 73 DEGREES
EKG P-R INTERVAL: 218 MS
EKG P-R INTERVAL: 252 MS
EKG Q-T INTERVAL: 392 MS
EKG Q-T INTERVAL: 404 MS
EKG QRS DURATION: 102 MS
EKG QRS DURATION: 106 MS
EKG QTC CALCULATION (BAZETT): 437 MS
EKG QTC CALCULATION (BAZETT): 451 MS
EKG R AXIS: -10 DEGREES
EKG R AXIS: -3 DEGREES
EKG T AXIS: 49 DEGREES
EKG T AXIS: 60 DEGREES
EKG VENTRICULAR RATE: 75 BPM
EKG VENTRICULAR RATE: 75 BPM
EOSINOPHIL # BLD: 0.18 K/UL (ref 0.05–0.5)
EOSINOPHILS RELATIVE PERCENT: 3 % (ref 0–6)
ERYTHROCYTE [DISTWIDTH] IN BLOOD BY AUTOMATED COUNT: 13.7 % (ref 11.5–15)
GFR, ESTIMATED: 38 ML/MIN/1.73M2
GLUCOSE BLD-MCNC: 108 MG/DL (ref 74–99)
GLUCOSE BLD-MCNC: 116 MG/DL (ref 74–99)
GLUCOSE BLD-MCNC: 124 MG/DL (ref 74–99)
GLUCOSE BLD-MCNC: 127 MG/DL (ref 74–99)
GLUCOSE SERPL-MCNC: 123 MG/DL (ref 74–99)
HCT VFR BLD AUTO: 38.7 % (ref 37–54)
HGB BLD-MCNC: 12.8 G/DL (ref 12.5–16.5)
IMM GRANULOCYTES # BLD AUTO: 0.05 K/UL (ref 0–0.58)
IMM GRANULOCYTES NFR BLD: 1 % (ref 0–5)
LYMPHOCYTES NFR BLD: 1.88 K/UL (ref 1.5–4)
LYMPHOCYTES RELATIVE PERCENT: 30 % (ref 20–42)
MAGNESIUM SERPL-MCNC: 2.3 MG/DL (ref 1.6–2.6)
MCH RBC QN AUTO: 29.8 PG (ref 26–35)
MCHC RBC AUTO-ENTMCNC: 33.1 G/DL (ref 32–34.5)
MCV RBC AUTO: 90 FL (ref 80–99.9)
MICROALBUMIN UR-MCNC: 21 MG/L (ref 0–20)
MICROALBUMIN/CREAT UR-RTO: 9 MCG/MG CREAT (ref 0–30)
MICROORGANISM SPEC CULT: NORMAL
MONOCYTES NFR BLD: 0.78 K/UL (ref 0.1–0.95)
MONOCYTES NFR BLD: 13 % (ref 2–12)
NEUTROPHILS NFR BLD: 53 % (ref 43–80)
NEUTS SEG NFR BLD: 3.27 K/UL (ref 1.8–7.3)
PHOSPHATE SERPL-MCNC: 4.5 MG/DL (ref 2.5–4.5)
PLATELET # BLD AUTO: 208 K/UL (ref 130–450)
PMV BLD AUTO: 9 FL (ref 7–12)
POTASSIUM SERPL-SCNC: 4.4 MMOL/L (ref 3.5–5)
PTH-INTACT SERPL-MCNC: 29 PG/ML (ref 15–65)
PTH-INTACT SERPL-MCNC: NORMAL PG/ML (ref 14–72)
RBC # BLD AUTO: 4.3 M/UL (ref 3.8–5.8)
SODIUM SERPL-SCNC: 137 MMOL/L (ref 132–146)
SPECIMEN DESCRIPTION: NORMAL
TOTAL PROTEIN, URINE: 14 MG/DL (ref 0–12)
URINE TOTAL PROTEIN CREATININE RATIO: 0.06 (ref 0–0.2)
WBC OTHER # BLD: 6.2 K/UL (ref 4.5–11.5)

## 2025-05-15 PROCEDURE — 85025 COMPLETE CBC W/AUTO DIFF WBC: CPT

## 2025-05-15 PROCEDURE — 82306 VITAMIN D 25 HYDROXY: CPT

## 2025-05-15 PROCEDURE — 76770 US EXAM ABDO BACK WALL COMP: CPT

## 2025-05-15 PROCEDURE — 80048 BASIC METABOLIC PNL TOTAL CA: CPT

## 2025-05-15 PROCEDURE — 83735 ASSAY OF MAGNESIUM: CPT

## 2025-05-15 PROCEDURE — 2060000000 HC ICU INTERMEDIATE R&B

## 2025-05-15 PROCEDURE — 36415 COLL VENOUS BLD VENIPUNCTURE: CPT

## 2025-05-15 PROCEDURE — 2500000003 HC RX 250 WO HCPCS: Performed by: INTERNAL MEDICINE

## 2025-05-15 PROCEDURE — 82962 GLUCOSE BLOOD TEST: CPT

## 2025-05-15 PROCEDURE — 94660 CPAP INITIATION&MGMT: CPT

## 2025-05-15 PROCEDURE — 82652 VIT D 1 25-DIHYDROXY: CPT

## 2025-05-15 PROCEDURE — 82330 ASSAY OF CALCIUM: CPT

## 2025-05-15 PROCEDURE — 6370000000 HC RX 637 (ALT 250 FOR IP): Performed by: INTERNAL MEDICINE

## 2025-05-15 PROCEDURE — 84100 ASSAY OF PHOSPHORUS: CPT

## 2025-05-15 PROCEDURE — 83970 ASSAY OF PARATHORMONE: CPT

## 2025-05-15 PROCEDURE — 93005 ELECTROCARDIOGRAM TRACING: CPT | Performed by: INTERNAL MEDICINE

## 2025-05-15 RX ADMIN — CLOPIDOGREL BISULFATE 75 MG: 75 TABLET, FILM COATED ORAL at 09:10

## 2025-05-15 RX ADMIN — ATORVASTATIN CALCIUM 80 MG: 40 TABLET, FILM COATED ORAL at 09:10

## 2025-05-15 RX ADMIN — SODIUM CHLORIDE, PRESERVATIVE FREE 10 ML: 5 INJECTION INTRAVENOUS at 21:38

## 2025-05-15 RX ADMIN — LISINOPRIL 10 MG: 10 TABLET ORAL at 09:10

## 2025-05-15 RX ADMIN — ISOSORBIDE MONONITRATE 60 MG: 60 TABLET, EXTENDED RELEASE ORAL at 09:10

## 2025-05-15 RX ADMIN — ISOSORBIDE MONONITRATE 60 MG: 60 TABLET, EXTENDED RELEASE ORAL at 21:37

## 2025-05-15 RX ADMIN — METOPROLOL SUCCINATE 50 MG: 50 TABLET, EXTENDED RELEASE ORAL at 09:10

## 2025-05-15 RX ADMIN — EZETIMIBE 10 MG: 10 TABLET ORAL at 09:10

## 2025-05-15 RX ADMIN — AMLODIPINE BESYLATE 5 MG: 5 TABLET ORAL at 09:10

## 2025-05-15 RX ADMIN — ASPIRIN 81 MG: 81 TABLET, COATED ORAL at 21:37

## 2025-05-15 NOTE — CARE COORDINATION
Patient sitting up in chair at side of the bed.  He is alert, oriented, pleasant.  Patient reports plan is home at [place], no needs currently noted.  He lives home alone but has support from local children and family.  Patient states family will transport home.

## 2025-05-16 VITALS
HEART RATE: 76 BPM | DIASTOLIC BLOOD PRESSURE: 73 MMHG | WEIGHT: 264 LBS | TEMPERATURE: 98.8 F | SYSTOLIC BLOOD PRESSURE: 99 MMHG | HEIGHT: 72 IN | RESPIRATION RATE: 17 BRPM | BODY MASS INDEX: 35.76 KG/M2 | OXYGEN SATURATION: 95 %

## 2025-05-16 LAB
1,25(OH)2D3 SERPL-MCNC: 6.8 PG/ML (ref 19.9–79.3)
ANION GAP SERPL CALCULATED.3IONS-SCNC: 12 MMOL/L (ref 7–16)
BASOPHILS # BLD: 0.04 K/UL (ref 0–0.2)
BASOPHILS NFR BLD: 1 % (ref 0–2)
BUN SERPL-MCNC: 35 MG/DL (ref 6–23)
CA-I BLD-SCNC: 1.24 MMOL/L (ref 1.15–1.33)
CALCIUM SERPL-MCNC: 9.9 MG/DL (ref 8.6–10.2)
CHLORIDE SERPL-SCNC: 100 MMOL/L (ref 98–107)
CO2 SERPL-SCNC: 23 MMOL/L (ref 22–29)
CREAT SERPL-MCNC: 1.7 MG/DL (ref 0.7–1.2)
EOSINOPHIL # BLD: 0.22 K/UL (ref 0.05–0.5)
EOSINOPHILS RELATIVE PERCENT: 3 % (ref 0–6)
ERYTHROCYTE [DISTWIDTH] IN BLOOD BY AUTOMATED COUNT: 13.7 % (ref 11.5–15)
GFR, ESTIMATED: 41 ML/MIN/1.73M2
GLUCOSE BLD-MCNC: 112 MG/DL (ref 74–99)
GLUCOSE BLD-MCNC: 175 MG/DL (ref 74–99)
GLUCOSE SERPL-MCNC: 134 MG/DL (ref 74–99)
HCT VFR BLD AUTO: 39.5 % (ref 37–54)
HGB BLD-MCNC: 13.1 G/DL (ref 12.5–16.5)
IMM GRANULOCYTES # BLD AUTO: 0.03 K/UL (ref 0–0.58)
IMM GRANULOCYTES NFR BLD: 0 % (ref 0–5)
LYMPHOCYTES NFR BLD: 2.26 K/UL (ref 1.5–4)
LYMPHOCYTES RELATIVE PERCENT: 34 % (ref 20–42)
MAGNESIUM SERPL-MCNC: 2.3 MG/DL (ref 1.6–2.6)
MCH RBC QN AUTO: 29.9 PG (ref 26–35)
MCHC RBC AUTO-ENTMCNC: 33.2 G/DL (ref 32–34.5)
MCV RBC AUTO: 90.2 FL (ref 80–99.9)
MONOCYTES NFR BLD: 0.74 K/UL (ref 0.1–0.95)
MONOCYTES NFR BLD: 11 % (ref 2–12)
NEUTROPHILS NFR BLD: 51 % (ref 43–80)
NEUTS SEG NFR BLD: 3.41 K/UL (ref 1.8–7.3)
PHOSPHATE SERPL-MCNC: 3.3 MG/DL (ref 2.5–4.5)
PLATELET # BLD AUTO: 245 K/UL (ref 130–450)
PMV BLD AUTO: 8.9 FL (ref 7–12)
POTASSIUM SERPL-SCNC: 4.8 MMOL/L (ref 3.5–5)
RBC # BLD AUTO: 4.38 M/UL (ref 3.8–5.8)
SODIUM SERPL-SCNC: 135 MMOL/L (ref 132–146)
WBC OTHER # BLD: 6.7 K/UL (ref 4.5–11.5)

## 2025-05-16 PROCEDURE — 80048 BASIC METABOLIC PNL TOTAL CA: CPT

## 2025-05-16 PROCEDURE — 94660 CPAP INITIATION&MGMT: CPT

## 2025-05-16 PROCEDURE — 82962 GLUCOSE BLOOD TEST: CPT

## 2025-05-16 PROCEDURE — 82330 ASSAY OF CALCIUM: CPT

## 2025-05-16 PROCEDURE — 83735 ASSAY OF MAGNESIUM: CPT

## 2025-05-16 PROCEDURE — 85025 COMPLETE CBC W/AUTO DIFF WBC: CPT

## 2025-05-16 PROCEDURE — 6370000000 HC RX 637 (ALT 250 FOR IP): Performed by: INTERNAL MEDICINE

## 2025-05-16 PROCEDURE — 36415 COLL VENOUS BLD VENIPUNCTURE: CPT

## 2025-05-16 PROCEDURE — 84100 ASSAY OF PHOSPHORUS: CPT

## 2025-05-16 RX ADMIN — ISOSORBIDE MONONITRATE 60 MG: 60 TABLET, EXTENDED RELEASE ORAL at 09:17

## 2025-05-16 RX ADMIN — ATORVASTATIN CALCIUM 80 MG: 40 TABLET, FILM COATED ORAL at 09:17

## 2025-05-16 RX ADMIN — EZETIMIBE 10 MG: 10 TABLET ORAL at 09:18

## 2025-05-16 RX ADMIN — METOPROLOL SUCCINATE 50 MG: 50 TABLET, EXTENDED RELEASE ORAL at 09:17

## 2025-05-16 RX ADMIN — AMLODIPINE BESYLATE 5 MG: 5 TABLET ORAL at 09:18

## 2025-05-16 RX ADMIN — CLOPIDOGREL BISULFATE 75 MG: 75 TABLET, FILM COATED ORAL at 09:18

## 2025-05-16 NOTE — DISCHARGE SUMMARY
[place] [id number]                            DISCHARGE SUMMARY      PATIENT NAME: DANG GOMEZ               : 1950  MED REC NO: [id number]                        ROOM: 0624  ACCOUNT NO: [id number]                       ADMIT DATE: 2025  PROVIDER: Inderjit Alegria DO      PRIMARY CARE PHYSICIAN:  Dr. Tarango    ADMITTING DIAGNOSIS:  Anterolateral ST-elevated myocardial infarction with status post cardiac catheterization with stent to left circumflex, May 13, 2025 by Dr. Cunningham.    SECONDARY DISCHARGE DIAGNOSES:  Extensive coronary artery disease with prior coronary artery bypass grafting x4, percutaneous coronary intervention with stent placement x2 prior to event; hypocalcemia; underlying chronic kidney disease stage 3; non-insulin-dependent diabetes mellitus type 2; dyslipidemia; essential hypertension; peripheral arterial disease; and former smoker.    COMPLICATIONS:  Mild renal insufficiency aggravated by contrast material.    OPERATION PERFORMED:  Cardiac catheterization with placement of stent, May 13, 2025 by Dr. Cunningham.    CONSULTATIONS OBTAINED:  Dr. Cunningham and Dr. Ugalde.  Also Critical Care.  No OMT was given.    ADDITIONAL ADMITTING PHYSICIANS:  Dr. Alegria and Dr. Soria.    CHIEF COMPLAINT/HISTORY OF CHIEF COMPLAINT:  This is a 75-year-old white male who was admitted to [facility]. The patient presented to the hospital here through the emergency room on May 13, 2025.  The patient presented to hospital here with a known extensive history of cardiac problems.  The patient presented to the emergency room with ST elevated myocardial infarction.  The patient was transferred from urgent care to the cath lab for intervention.  The patient underwent PCI with stent placement to the left circumflex artery.  The patient tolerated the procedure well.  The patient was admitted to the hospital to the

## 2025-05-16 NOTE — PLAN OF CARE
Problem: Chronic Conditions and Co-morbidities  Goal: Patient's chronic conditions and co-morbidity symptoms are monitored and maintained or improved  Outcome: Progressing     Problem: Discharge Planning  Goal: Discharge to home or other facility with appropriate resources  Outcome: Progressing  Flowsheets (Taken 5/15/2025 2000)  Discharge to home or other facility with appropriate resources:   Identify barriers to discharge with patient and caregiver   Arrange for needed discharge resources and transportation as appropriate     Problem: Safety - Adult  Goal: Free from fall injury  Outcome: Progressing     Problem: ABCDS Injury Assessment  Goal: Absence of physical injury  Outcome: Progressing

## 2025-05-16 NOTE — PROGRESS NOTES
Date:  5/14/2025  Patient: Joshua Torres  Admission:  5/13/2025  9:54 AM  Admit DX: STEMI (ST elevation myocardial infarction) (HCC) [I21.3]  ST elevation myocardial infarction (STEMI), unspecified artery (HCC) [I21.3]  Age:  75 y.o., 1950     LOS: 1 day       SUBJECTIVE:        Patient was seen and examined earlier today  I discussed with him and his family  He is doing much better clinically  He had good urine output after IV Lasix  Creatinine is stable  No chest pain      OBJECTIVE:    BP (!) 111/52   Pulse 66   Temp 97.9 °F (36.6 °C) (Axillary)   Resp 18   Ht 1.829 m (6')   Wt 119.7 kg (264 lb)   SpO2 90%   BMI 35.80 kg/m²     Intake/Output Summary (Last 24 hours) at 5/14/2025 7123  Last data filed at 5/14/2025 1134  Gross per 24 hour   Intake 1387.7 ml   Output 2900 ml   Net -1512.3 ml       EXAM:       General: Alert and oriented, No acute distress.  Appears as stated age.  Eye:  Pupils are equal, round and reactive to light, Extraocular movements are intact, Normal conjunctiva.    Head: Normocephalic, Normal hearing, Oral mucosa is moist.  Neck: Supple, No carotid bruit, No jugular venous distention, No lymphadenopathy.  Respiratory: Mild bibasilar crackles, Respirations are non-labored, Breath sounds are equal, Symmetrical chest wall expansion.  Cardiovascular: Normal rate, No murmur, No gallop, Good pulses equal in all extremities, No edema.  Gastrointestinal: Soft, Non-tender, Non-distended, Normal bowel sounds.  Musculoskeletal: Normal strength, No tenderness, No deformity.  Integumentary:  Warm, Dry.    Neurologic: Alert, Oriented, No focal deficits.  Cognition and Speech: Oriented, Speech clear and coherent.  Psychiatric: Cooperative, Appropriate mood & affect, Normal judgment.        Current Inpatient Medications:   sodium chloride flush  5-40 mL IntraVENous 2 times per day    lisinopril  10 mg Oral BID    amLODIPine  5 mg Oral Daily    aspirin  81 mg Oral Nightly    atorvastatin  80 mg 
  Date:  5/16/2025  Patient: Joshua Torres  Admission:  5/13/2025  9:54 AM  Admit DX: STEMI (ST elevation myocardial infarction) (HCC) [I21.3]  ST elevation myocardial infarction (STEMI), unspecified artery (HCC) [I21.3]  Age:  75 y.o., 1950     LOS: 3 days       SUBJECTIVE:        Patient seen and examined  He is feeling much better  Totally asymptomatic  Creatinine is better    OBJECTIVE:    BP 99/73   Pulse 76   Temp 98.8 °F (37.1 °C)   Resp 17   Ht 1.829 m (6')   Wt 119.7 kg (264 lb)   SpO2 95%   BMI 35.80 kg/m²   No intake or output data in the 24 hours ending 05/16/25 1007      EXAM:       General: Alert and oriented, No acute distress.  Appears as stated age.  Eye:  Pupils are equal, round and reactive to light, Extraocular movements are intact, Normal conjunctiva.    Head: Normocephalic, Normal hearing, Oral mucosa is moist.  Neck: Supple, No carotid bruit, No jugular venous distention, No lymphadenopathy.  Respiratory: Mild bibasilar crackles, Respirations are non-labored, Breath sounds are equal, Symmetrical chest wall expansion.  Cardiovascular: Normal rate, No murmur, No gallop, Good pulses equal in all extremities, No edema.  Gastrointestinal: Soft, Non-tender, Non-distended, Normal bowel sounds.  Musculoskeletal: Normal strength, No tenderness, No deformity.  Integumentary:  Warm, Dry.    Neurologic: Alert, Oriented, No focal deficits.  Cognition and Speech: Oriented, Speech clear and coherent.  Psychiatric: Cooperative, Appropriate mood & affect, Normal judgment.        Current Inpatient Medications:   sodium chloride flush  5-40 mL IntraVENous 2 times per day    [Held by provider] lisinopril  10 mg Oral BID    amLODIPine  5 mg Oral Daily    aspirin  81 mg Oral Nightly    atorvastatin  80 mg Oral Daily    clopidogrel  75 mg Oral Daily    ezetimibe  10 mg Oral Daily    metoprolol succinate  50 mg Oral Daily    insulin lispro  0-4 Units SubCUTAneous 4x Daily AC & HS    isosorbide 
  Nephrology Progress Note  Patient's Name: Joshua Torres  3:13 PM  5/16/2025    History of Present Illness:    Joshua Torres is a 75 y.o. male with PMHx HTN, HLD, T2DM, CAD s/p CABG.  He appears to have chronic kidney disease stage G3A with baseline creatinine between 1.1 and 1.4 mg/dL with EGFR and GFR in the 50 to 55 mL/min range. Had history of nephrolithiasis in 2019 on CT scan.    Patient presented to [facility] with chest pain and exertional dyspnea, found to have STEMI, underwent left heart cath with PCI on 5/13.  Nephrology was asked to follow-up for CKD.    Interval History:    5/15: Patient seen and examined at bedside.  He is resting comfortably.  Patient's wife is present.  Patient denies chest pain, dizziness, headache or lightheadedness.  Labs reviewed with patient and wife, slight drop in creatinine.  Likely due to diuretics and some hypotension.  He is making good urine output.  He has no new complaints today.  No chest pain or shortness of breath.    5/16: Patient seen and examined.  Blood pressures are improved.  He is in no acute distress.  Patient being discharged.  No chest pain, shortness of breath, nausea, leg swelling, vomiting.    Past Medical History:   Diagnosis Date    CAD (coronary artery disease)     Diabetes mellitus (HCC)     Hx of cardiac cath 07/1999    PCI of the RCA    Hyperlipidemia     Hypertension        Past Surgical History:   Procedure Laterality Date    CARDIAC PROCEDURE N/A 5/13/2025    Percutaneous coronary intervention performed by Yehuda Cunningham MD at [facility] CARDIAC CATH/IR    CARDIAC PROCEDURE N/A 5/13/2025    Left heart cath performed by Yehuda Cunningham MD at [facility] CARDIAC CATH/IR    CORONARY ANGIOPLASTY WITH STENT PLACEMENT      2.5 x 12 mm AVE BMS RCA    CORONARY ARTERY BYPASS GRAFT  08/2001    LIMA-LAD, left radial-OM, SVG-diagonal, SVG-PDA.([facility])    DIAGNOSTIC CARDIAC CATH LAB PROCEDURE  7/1999, 1/2000, 6/2013    KNEE ARTHROSCOPY         Family 
  Nephrology Progress Note  Patient's Name: Joshua Torres  4:11 PM  5/15/2025    History of Present Illness:    Joshua Torres is a 75 y.o. male with PMHx HTN, HLD, T2DM, CAD s/p CABG.  He appears to have chronic kidney disease stage G3A with baseline creatinine between 1.1 and 1.4 mg/dL with EGFR and GFR in the 50 to 55 mL/min range. Had history of nephrolithiasis in 2019 on CT scan.    Patient presented to [facility] with chest pain and exertional dyspnea, found to have STEMI, underwent left heart cath with PCI on 5/13.  Nephrology was asked to follow-up for CKD.    Interval History:    5/15: Patient seen and examined at bedside.  He is resting comfortably.  Patient's wife is present.  Patient denies chest pain, dizziness, headache or lightheadedness.  Labs reviewed with patient and wife, slight drop in creatinine.  Likely due to diuretics and some hypotension.  He is making good urine output.  He has no new complaints today.  No chest pain or shortness of breath.    Past Medical History:   Diagnosis Date    CAD (coronary artery disease)     Diabetes mellitus (HCC)     Hx of cardiac cath 07/1999    PCI of the RCA    Hyperlipidemia     Hypertension        Past Surgical History:   Procedure Laterality Date    CARDIAC PROCEDURE N/A 5/13/2025    Percutaneous coronary intervention performed by Yehuda Cunningham MD at [facility] CARDIAC CATH/IR    CARDIAC PROCEDURE N/A 5/13/2025    Left heart cath performed by Yehuda Cunningham MD at [facility] CARDIAC CATH/IR    CORONARY ANGIOPLASTY WITH STENT PLACEMENT      2.5 x 12 mm AVE BMS RCA    CORONARY ARTERY BYPASS GRAFT  08/2001    LIMA-LAD, left radial-OM, SVG-diagonal, SVG-PDA.([facility])    DIAGNOSTIC CARDIAC CATH LAB PROCEDURE  7/1999, 1/2000, 6/2013    KNEE ARTHROSCOPY         Family History   Problem Relation Age of Onset    High Blood Pressure Mother     High Cholesterol Mother     Cancer Brother         reports that he quit smoking about 34 years ago. His smoking use 
4 Eyes Skin Assessment     NAME:  Joshua Torres  YOB: 1950  MEDICAL RECORD NUMBER:  [id number]    The patient is being assessed for  Admission    I agree that at least one RN has performed a thorough Head to Toe Skin Assessment on the patient. ALL assessment sites listed below have been assessed.      Areas assessed by both nurses:    Head, Face, Ears, Shoulders, Back, Chest, Arms, Elbows, Hands, Sacrum. Buttock, Coccyx, Ischium, and Legs. Feet and Heels        Does the Patient have a Wound? No noted wound(s)       Dannie Prevention initiated by RN: Yes  Wound Care Orders initiated by RN: No    Pressure Injury (Stage 3,4, Unstageable, DTI, NWPT, and Complex wounds) if present, place Wound referral order by RN under : No    New Ostomies, if present place, Ostomy referral order under : No     Nurse 1 eSignature: Electronically signed by Yosi Headley RN on 5/13/25 at 6:39 PM EDT    **SHARE this note so that the co-signing nurse can place an eSignature**    Nurse 2 eSignature: Electronically signed by Amanda Santamaria RN on 5/13/25 at 6:40 PM EDT   
Acute coronary syndrome  He was still having active chest pain in the emergency room.  Previous four-vessel CABG  Previous stent of proximal circumflex    Management plan:    Had emergency catheterization that showed severe distal circumflex 80% stenosis.  This was supplying the chronically occluded RCA with collaterals.  He had successful angioplasty and stenting of the distal circumflex artery extending to the obtuse marginal distal  Patient is doing fairly well post PCI and is asymptomatic  LVEF is more than 50%.  Orders are written    
Assessment and Plan  Patient is a 75 y.o. male with the following medical Problems:   Lateral ST elevation MI ( S/P PCI to  distal left circumflex)  Coronary artery disease with prior CABG  Type 2 diabetes (hemoglobin A1c 6.6 May 14, 2025)  CKD stage III  Hypercalcemia  Hypertension  Dyslipidemia  Morbid obesity  Former nicotine abuse  Probable obstructive sleep apnea.    Plan of care:  Continue with dual antiplatelet therapy.  Patient is chronically on metoprolol and lisinopril.  Lisinopril was held due to CKD and currently patient is on amlodipine.  Will discuss with primary team ACE inhibitors and beta-blockers in the setting of coronary artery disease and type 2 diabetes.  Continue with statin.  Heart healthy diet.  Patient is obese and likely has obstructive sleep apnea.  He would benefit from sleep study and possibly positive pressure ventilation while sleeping, napping, and as needed.  Follow-up cardiology recommendations.  Echocardiography.  PTH is not very suppressed.  Nephrology consult for CKD  Patient will need to follow-up with endocrinologist as outpatient to follow-up hypercalcemia.  BiPAP while sleeping, napping, and as needed    History of Present Illness:   Patient is a 75-year-old man with above-mentioned medical problems who presented with chest pain.  EKG was suggestive of a STEMI and patient underwent PCI with stent to the left circumflex.  Patient was admitted to the ICU post procedure for monitoring.  Chest pain has resolved post PCI.    Daily progress:  May 14, 2025: Patient desaturated overnight and was placed on supplemental oxygen.  He is currently on 3 L nasal cannula.  Chest x-ray was ordered to rule out pulmonary edema.  Chest x-ray will be personally reviewed and independently interpreted.  Patient was educated about his high calcium and elevated creatinine and the need for follow-up with a nephrologist and endocrinologist.  PTH is lower but not suppressed.  Blood sugar has been 
Assessment and Plan  Patient is a 75 y.o. male with the following medical Problems:   Lateral ST elevation MI ( S/P PCI to  distal left circumflex)  Coronary artery disease with prior CABG  Type 2 diabetes (hemoglobin A1c 6.6 May 14, 2025)  CKD stage III  Hypercalcemia  Hypertension  Dyslipidemia  Morbid obesity  Former nicotine abuse  Probable obstructive sleep apnea.    Plan of care:  Continue with dual antiplatelet therapy.  Patient is chronically on metoprolol and lisinopril.  Lisinopril was held due to CKD and currently patient is on amlodipine.  Will discuss with primary team ACE inhibitors and beta-blockers in the setting of coronary artery disease and type 2 diabetes.  Continue with statin.  Heart healthy diet.  Patient is obese and likely has obstructive sleep apnea.  He would benefit from sleep study and possibly positive pressure ventilation while sleeping, napping, and as needed.  Follow-up cardiology recommendations.  Echocardiography.  PTH is not very suppressed.  Nephrology consult for CKD  Patient will need to follow-up with endocrinologist as outpatient to follow-up hypercalcemia.  BiPAP while sleeping, napping, and as needed  Follow-up with Dr. Goss in 4 weeks for sleep study.    History of Present Illness:   Patient is a 75-year-old man with above-mentioned medical problems who presented with chest pain.  EKG was suggestive of a STEMI and patient underwent PCI with stent to the left circumflex.  Patient was admitted to the ICU post procedure for monitoring.  Chest pain has resolved post PCI.    Daily progress:  May 14, 2025: Patient desaturated overnight and was placed on supplemental oxygen.  He is currently on 3 L nasal cannula.  Chest x-ray was ordered to rule out pulmonary edema.  Chest x-ray will be personally reviewed and independently interpreted.  Patient was educated about his high calcium and elevated creatinine and the need for follow-up with a nephrologist and endocrinologist.  PTH is 
Assessment and Plan  Patient is a 75 y.o. male with the following medical Problems:   Lateral ST elevation MI ( S/P PCI to  distal left circumflex)  Coronary artery disease with prior CABG  Type 2 diabetes (hemoglobin A1c 6.6 May 14, 2025)  CKD stage III  Hypercalcemia  Hypertension  Dyslipidemia  Morbid obesity  Former nicotine abuse  Probable obstructive sleep apnea.    Plan of care:  Continue with dual antiplatelet therapy.  Patient is chronically on metoprolol and lisinopril.  Lisinopril was held due to CKD and currently patient is on amlodipine.  Will discuss with primary team ACE inhibitors and beta-blockers in the setting of coronary artery disease and type 2 diabetes.  Continue with statin.  Heart healthy diet.  Patient is obese and likely has obstructive sleep apnea.  He would benefit from sleep study and possibly positive pressure ventilation while sleeping, napping, and as needed.  Follow-up cardiology recommendations.  Echocardiography.  PTH is not very suppressed.  Nephrology consult for CKD  Patient will need to follow-up with endocrinologist as outpatient to follow-up hypercalcemia.  BiPAP while sleeping, napping, and as needed  Follow-up with Dr. Goss in 4 weeks for sleep study.    History of Present Illness:   Patient is a 75-year-old man with above-mentioned medical problems who presented with chest pain.  EKG was suggestive of a STEMI and patient underwent PCI with stent to the left circumflex.  Patient was admitted to the ICU post procedure for monitoring.  Chest pain has resolved post PCI.    Daily progress:  May 14, 2025: Patient desaturated overnight and was placed on supplemental oxygen.  He is currently on 3 L nasal cannula.  Chest x-ray was ordered to rule out pulmonary edema.  Chest x-ray will be personally reviewed and independently interpreted.  Patient was educated about his high calcium and elevated creatinine and the need for follow-up with a nephrologist and endocrinologist.  PTH is 
During bedside handoff, patient states he feels \"difficulty breathing.\" This started 25 minutes prior, previous RN had applied NC at 3 L, but patient has not had relief of SOB yet. Vitals are spo2 95%, RR 17, hr 72 nsr, bp 152/91. Patient does not appear to be in any distress- respirations are even, unlabored, chest rise symmetrical, upper lung fields are clear, lung bases slightly diminished. Patient sitting in high fowlers to promote oxygen exchange. Cardiology notified, see new orders.   
Internal Medicine Consult Note    [facility]    Inderjit Alegria D.O., DANISI.                    Ludwig Soria D.O., DANISI.                             Robson Santiago D.O.     David Romano, MSN, APRN-CNP  John Rose, MSN, APRN-CNP  Gisela Felton, MSN APRN-CNP  Angelique Corbin, MSN. APRN-NP-C     Primary Care Physician: Arleen Tarango MD   Admitting Physician:  Yehuda Cunningham MD  Admission date and time: 5/13/2025  9:54 AM    Room:  [id number]/[place] [address]  Admitting diagnosis: STEMI (ST elevation myocardial infarction) (HCC) [I21.3]  ST elevation myocardial infarction (STEMI), unspecified artery (HCC) [I21.3]    Patient Name: Joshua Torres  MRN: [id number]    Date of Service: 5/14/2025     Subjective:  Joshua is a 75 y.o. male who was seen and examined today,5/14/2025, at the bedside.  Patient resting company at the present time.  Sitting upright in chair position in the bed.  Complains that he has problems with orthopnea and low back pain at home.  Currently denies any chest pain.  Is currently on nitro drip.  Hemodynamically stable with no dysrhythmia noted.  Nursing    No family present during my examination.    Review of System:   Constitutional:   Denies fever or chills, weight loss or gain, fatigue or malaise.  HEENT:   Denies ear pain, sore throat, sinus or eye problems.  Supplemental O2  Cardiovascular:   Denies any chest pain, irregular heartbeats, or palpitations.   Respiratory:   Mild shortness of breath  Gastrointestinal:   Denies nausea, vomiting, diarrhea, or constipation.  Denies any abdominal pain.  Genitourinary:    Denies any urgency, frequency, hematuria. Voiding  without difficulty.  Extremities:   Denies lower extremity swelling, edema or cyanosis.   Neurology:    Denies any headache or focal neurological deficits, Denies generalized weakness or memory difficulty.   Psch:   Denies being anxious or depressed.  Musculoskeletal:    Denies  myalgias, joint 
Internal Medicine Consult Note    [facility]    Inderjit Alegria D.O., DANISI.                    Ludwig Soria D.O., DANISIMariana Santiago D.O.     David Romano, MSN, APRN-CNP  John Rose, MSN, APRN-CNP  Gisela Felton, MSN APRN-CNP  Angelique Corbin, MSN. APRN-NP-C     Primary Care Physician: Arleen Tarango MD   Admitting Physician:  Yehuda Cunningham MD  Admission date and time: 5/13/2025  9:54 AM    Room:  [address] [place] [address]  Admitting diagnosis: STEMI (ST elevation myocardial infarction) (HCC) [I21.3]  ST elevation myocardial infarction (STEMI), unspecified artery (HCC) [I21.3]    Patient Name: Joshua Torres  MRN: [id number]    Date of Service: 5/15/2025     Subjective:  Joshua is a 75 y.o. male who was seen and examined today,5/15/2025, at the bedside.  Patient sitting up in the chair resting comfortably.  His girlfriend is at the bedside.  Patient denies any chest pain or shortness of breath at this time.  Patient's renal function did increase since yesterday to BUN of 29 creatinine 1.8.  Will consult nephrology.  No family present during my examination.    Review of System:   Constitutional:   Denies fever or chills, weight loss or gain, fatigue or malaise.  HEENT:   Denies ear pain, sore throat, sinus or eye problems.   Cardiovascular:   Denies any chest pain, irregular heartbeats, or palpitations.   Respiratory:   Mild shortness of breath  Gastrointestinal:   Denies nausea, vomiting, diarrhea, or constipation.  Denies any abdominal pain.  Genitourinary:    Denies any urgency, frequency, hematuria. Voiding  without difficulty.  Extremities:   Denies lower extremity swelling, edema or cyanosis.   Neurology:    Denies any headache or focal neurological deficits, Denies generalized weakness or memory difficulty.   Psch:   Denies being anxious or depressed.  Musculoskeletal:    Denies  myalgias, joint complaints or back pain.   Integumentary:   Denies 
Patients son is bedside and is inquiring about transfer to [facility]. The son states he and his family believe it would be in patients best interest to transfer to [facility] where he has been seen for his cardiac history and where he sees his cardiologist, DR Martinez. Patient states he agrees. DR Cunningham notified, Dr states he will initiate the transfer and come bedside to discuss with patient and family.  
Pt ambulated up and down the halls this morning without difficulty, did not get SOB or have heart palpitations. Oxygen sats WNL.  
Spiritual Health History and Assessment/Progress Note  [facility]    (P) Spiritual/Emotional Needs,  ,  ,      Name: Joshua Torres MRN: [id number]    Age: 75 y.o.     Sex: male   Language: English   Jainism: Nazarene   STEMI (ST elevation myocardial infarction) (HCC)     Date: 5/13/2025                           Spiritual Assessment began in [facility] 2 ICU        Referral/Consult From: (P) Rounding   Encounter Overview/Reason: (P) Spiritual/Emotional Needs  Service Provided For: (P) Patient and family together    Bel, Belief, Meaning:   Patient identifies as spiritual and is connected with a bel tradition or spiritual practice  Family/Friends identify as spiritual and are connected with a bel tradition or spiritual practice      Importance and Influence:  Patient has spiritual/personal beliefs that influence decisions regarding their health  Family/Friends have spiritual/personal beliefs that influence decisions regarding the patient's health    Community:  Patient feels well-supported. Support system includes: Children and Extended family  Family/Friends feel well-supported. Support system includes: Children and Extended family    Assessment and Plan of Care:     Patient Interventions include: Facilitated expression of thoughts and feelings, Explored spiritual coping/struggle/distress, and Affirmed coping skills/support systems  Family/Friends Interventions include: Facilitated expression of thoughts and feelings, Explored spiritual coping/struggle/distress, and Affirmed coping skills/support systems    Patient Plan of Care: Spiritual Care available upon further referral  Family/Friends Plan of Care: Spiritual Care available upon further referral    Electronically signed by TRENT Peña on 5/13/2025 at 7:03 PM   
tomorrow          Yehuda Cunningham MD, MD, FACC

## 2025-05-16 NOTE — CARE COORDINATION
[facility] noted.  He lives home alone but has support from local children and family.  Patient seen up in halls ambulating independent.  Patient states family will transport home.

## 2025-05-19 ENCOUNTER — TELEPHONE (OUTPATIENT)
Dept: ENDOCRINOLOGY | Age: 75
End: 2025-05-19

## 2025-05-19 NOTE — TELEPHONE ENCOUNTER
Patient requesting hospital follow up per discharge instructions: Follow up with Reed Hoskins MD (Endocrinology) in 4 weeks (6/13/2025) Please advise patient [phone].

## 2025-05-22 DIAGNOSIS — I10 HYPERTENSION, UNSPECIFIED TYPE: ICD-10-CM

## 2025-05-22 LAB
ANION GAP SERPL CALCULATED.3IONS-SCNC: 10 MMOL/L (ref 7–16)
BUN SERPL-MCNC: 18 MG/DL (ref 8–23)
CALCIUM SERPL-MCNC: 9.7 MG/DL (ref 8.8–10.2)
CHLORIDE SERPL-SCNC: 108 MMOL/L (ref 98–107)
CO2 SERPL-SCNC: 21 MMOL/L (ref 22–29)
CREAT SERPL-MCNC: 1.2 MG/DL (ref 0.7–1.2)
ERYTHROCYTE [DISTWIDTH] IN BLOOD BY AUTOMATED COUNT: 14.1 % (ref 11.5–15)
GFR, ESTIMATED: 61 ML/MIN/1.73M2
GLUCOSE SERPL-MCNC: 115 MG/DL (ref 74–99)
HCT VFR BLD AUTO: 40 % (ref 37–54)
HGB BLD-MCNC: 12.8 G/DL (ref 12.5–16.5)
MAGNESIUM SERPL-MCNC: 2.2 MG/DL (ref 1.6–2.4)
MCH RBC QN AUTO: 29.9 PG (ref 26–35)
MCHC RBC AUTO-ENTMCNC: 32 G/DL (ref 32–34.5)
MCV RBC AUTO: 93.5 FL (ref 80–99.9)
PHOSPHATE SERPL-MCNC: 3.2 MG/DL (ref 2.5–4.5)
PLATELET # BLD AUTO: 285 K/UL (ref 130–450)
PMV BLD AUTO: 8.9 FL (ref 7–12)
POTASSIUM SERPL-SCNC: 5.3 MMOL/L (ref 3.5–5.1)
RBC # BLD AUTO: 4.28 M/UL (ref 3.8–5.8)
SODIUM SERPL-SCNC: 139 MMOL/L (ref 136–145)
WBC OTHER # BLD: 6.6 K/UL (ref 4.5–11.5)

## 2025-06-23 ENCOUNTER — HOSPITAL ENCOUNTER (OUTPATIENT)
Dept: CARDIAC REHAB | Age: 75
Setting detail: THERAPIES SERIES
Discharge: HOME | End: 2025-06-23
Payer: MEDICARE

## 2025-06-23 VITALS — BODY MASS INDEX: 36.21 KG/M2 | OXYGEN SATURATION: 95 % | WEIGHT: 267.3 LBS | HEIGHT: 72 IN

## 2025-06-23 PROCEDURE — 93798 PHYS/QHP OP CAR RHAB W/ECG: CPT

## 2025-06-23 PROCEDURE — 93797 PHYS/QHP OP CAR RHAB WO ECG: CPT

## 2025-06-23 ASSESSMENT — PATIENT HEALTH QUESTIONNAIRE - PHQ9
8. MOVING OR SPEAKING SO SLOWLY THAT OTHER PEOPLE COULD HAVE NOTICED. OR THE OPPOSITE, BEING SO FIGETY OR RESTLESS THAT YOU HAVE BEEN MOVING AROUND A LOT MORE THAN USUAL: NOT AT ALL
1. LITTLE INTEREST OR PLEASURE IN DOING THINGS: NOT AT ALL
3. TROUBLE FALLING OR STAYING ASLEEP: NOT AT ALL
5. POOR APPETITE OR OVEREATING: NOT AT ALL
SUM OF ALL RESPONSES TO PHQ QUESTIONS 1-9: 2
7. TROUBLE CONCENTRATING ON THINGS, SUCH AS READING THE NEWSPAPER OR WATCHING TELEVISION: NOT AT ALL
10. IF YOU CHECKED OFF ANY PROBLEMS, HOW DIFFICULT HAVE THESE PROBLEMS MADE IT FOR YOU TO DO YOUR WORK, TAKE CARE OF THINGS AT HOME, OR GET ALONG WITH OTHER PEOPLE: NOT DIFFICULT AT ALL
6. FEELING BAD ABOUT YOURSELF - OR THAT YOU ARE A FAILURE OR HAVE LET YOURSELF OR YOUR FAMILY DOWN: NOT AT ALL
2. FEELING DOWN, DEPRESSED OR HOPELESS: NOT AT ALL
SUM OF ALL RESPONSES TO PHQ QUESTIONS 1-9: 2
4. FEELING TIRED OR HAVING LITTLE ENERGY: MORE THAN HALF THE DAYS
9. THOUGHTS THAT YOU WOULD BE BETTER OFF DEAD, OR OF HURTING YOURSELF: NOT AT ALL

## 2025-06-23 ASSESSMENT — NEW YORK HEART ASSOCIATION (NYHA) CLASSIFICATION: NYHA FUNCTIONAL CLASS: NO NYHA CLASS OR UNABLE TO DETERMINE

## 2025-06-23 ASSESSMENT — EJECTION FRACTION: EF_VALUE: 60

## 2025-06-23 NOTE — PROGRESS NOTES
06/23/25 0930   Treatment Diagnosis   Treatment Diagnosis 1 PCI   PCI Date 05/13/25   Referral Date 05/13/25   Significant Cardiovascular History Peripheral arterial disease;Previous CABG;Previous myocardial infarction;Previous PCI;History of heart failure   NYHA Heart Failure Classification No NYHA class or unable to determine   Co-morbidities Diabetes;Peripheral arterial disease;Previous MI;Renal disease   Individual Treatment Plan   ITP Visit Type Initial assessment   ITP Next Review Date 07/16/25   Visit #/Total Visits 2/36   EF% 60 %  (ECHO 5/14/2025)   Risk Stratification Low   Supplemental Oxygen   Oxygen Use No   Exercise Assessment   Stages of Change Preparation   DASI Total Score 37.45   Assisted Devices None   Test Six minute walk test   Data Measured Before Test   Heart Rate 65   Resting Blood Pressure 120/80   SpO2 95   O2 Device Room air   RPD 0   Data Measured During Test   Indicate Mode of RPE 3 minutes   Modality Track   Track Distance 30 meters   METS 2   Symptoms   (Patient denies)   Problems While Exercising Patient denies   Describe Type of Pain You Are Having Patient denies   Blood Pressure 180/80   Lowest SpO2 95 %   O2 Device Room air   RPD 0   RPE 12   Data Measured at Peak   Distance Calculated in  m   Distance (meters) 370 meters   Peak Heart Rate 90   Peak Blood Pressure 184/80   Peak RPE 12   SpO2 97   Data Measured at 5 Minutes After Test   Heart Rate 63   Blood Pressure 142/76   RPD 0   SpO2 97 %   O2 Device Room air   Comments Patient successfully completed the 6MWT without any issues or complications. Patient denies feelings of CP, fatigue, SOB and dizziness.   Exercise Intervention/Prescription   Mode Treadmill;Bike;Stepper;Ergometer   Frequency per week 3   Duration Per Session 30 minutes   Intensity METS       2-4   Target RPE 11-15   Progression Patient to progress to 60 minutes worth of aerobic exercise, as tolerated.   Symptoms with Exercise Denies   Target Heart Rate

## 2025-06-25 ENCOUNTER — HOSPITAL ENCOUNTER (OUTPATIENT)
Dept: CARDIAC REHAB | Age: 75
Setting detail: THERAPIES SERIES
Discharge: HOME | End: 2025-06-25
Payer: MEDICARE

## 2025-06-25 PROCEDURE — 93798 PHYS/QHP OP CAR RHAB W/ECG: CPT

## 2025-06-27 ENCOUNTER — HOSPITAL ENCOUNTER (OUTPATIENT)
Dept: CARDIAC REHAB | Age: 75
Setting detail: THERAPIES SERIES
Discharge: HOME | End: 2025-06-27
Payer: MEDICARE

## 2025-06-27 PROCEDURE — 93798 PHYS/QHP OP CAR RHAB W/ECG: CPT

## 2025-06-30 ENCOUNTER — HOSPITAL ENCOUNTER (OUTPATIENT)
Dept: CARDIAC REHAB | Age: 75
Setting detail: THERAPIES SERIES
Discharge: HOME | End: 2025-06-30
Payer: MEDICARE

## 2025-06-30 PROCEDURE — 93798 PHYS/QHP OP CAR RHAB W/ECG: CPT

## 2025-07-02 ENCOUNTER — HOSPITAL ENCOUNTER (OUTPATIENT)
Dept: CARDIAC REHAB | Age: 75
Setting detail: THERAPIES SERIES
Discharge: HOME | End: 2025-07-02
Payer: MEDICARE

## 2025-07-02 PROCEDURE — 93798 PHYS/QHP OP CAR RHAB W/ECG: CPT

## 2025-07-04 ENCOUNTER — APPOINTMENT (OUTPATIENT)
Dept: CARDIAC REHAB | Age: 75
End: 2025-07-04
Payer: MEDICARE

## 2025-07-07 ENCOUNTER — HOSPITAL ENCOUNTER (OUTPATIENT)
Dept: CARDIAC REHAB | Age: 75
Setting detail: THERAPIES SERIES
Discharge: HOME | End: 2025-07-07
Payer: MEDICARE

## 2025-07-07 PROCEDURE — 93798 PHYS/QHP OP CAR RHAB W/ECG: CPT

## 2025-07-09 ENCOUNTER — HOSPITAL ENCOUNTER (OUTPATIENT)
Dept: CARDIAC REHAB | Age: 75
Setting detail: THERAPIES SERIES
Discharge: HOME | End: 2025-07-09
Payer: MEDICARE

## 2025-07-09 PROCEDURE — 93798 PHYS/QHP OP CAR RHAB W/ECG: CPT

## 2025-07-11 ENCOUNTER — HOSPITAL ENCOUNTER (OUTPATIENT)
Dept: CARDIAC REHAB | Age: 75
Setting detail: THERAPIES SERIES
Discharge: HOME | End: 2025-07-11
Payer: MEDICARE

## 2025-07-11 PROCEDURE — 93798 PHYS/QHP OP CAR RHAB W/ECG: CPT

## 2025-07-14 ENCOUNTER — HOSPITAL ENCOUNTER (OUTPATIENT)
Dept: CARDIAC REHAB | Age: 75
Setting detail: THERAPIES SERIES
Discharge: HOME | End: 2025-07-14
Payer: MEDICARE

## 2025-07-14 PROCEDURE — 93798 PHYS/QHP OP CAR RHAB W/ECG: CPT

## 2025-07-18 ENCOUNTER — HOSPITAL ENCOUNTER (OUTPATIENT)
Dept: CARDIAC REHAB | Age: 75
Setting detail: THERAPIES SERIES
Discharge: HOME | End: 2025-07-18
Payer: MEDICARE

## 2025-07-18 PROCEDURE — 93798 PHYS/QHP OP CAR RHAB W/ECG: CPT

## 2025-07-18 ASSESSMENT — PATIENT HEALTH QUESTIONNAIRE - PHQ9
1. LITTLE INTEREST OR PLEASURE IN DOING THINGS: NOT AT ALL
5. POOR APPETITE OR OVEREATING: NOT AT ALL
7. TROUBLE CONCENTRATING ON THINGS, SUCH AS READING THE NEWSPAPER OR WATCHING TELEVISION: NOT AT ALL
10. IF YOU CHECKED OFF ANY PROBLEMS, HOW DIFFICULT HAVE THESE PROBLEMS MADE IT FOR YOU TO DO YOUR WORK, TAKE CARE OF THINGS AT HOME, OR GET ALONG WITH OTHER PEOPLE: NOT DIFFICULT AT ALL
4. FEELING TIRED OR HAVING LITTLE ENERGY: NOT AT ALL
SUM OF ALL RESPONSES TO PHQ QUESTIONS 1-9: 0
9. THOUGHTS THAT YOU WOULD BE BETTER OFF DEAD, OR OF HURTING YOURSELF: NOT AT ALL
SUM OF ALL RESPONSES TO PHQ QUESTIONS 1-9: 0
2. FEELING DOWN, DEPRESSED OR HOPELESS: NOT AT ALL
SUM OF ALL RESPONSES TO PHQ QUESTIONS 1-9: 0
3. TROUBLE FALLING OR STAYING ASLEEP: NOT AT ALL
SUM OF ALL RESPONSES TO PHQ QUESTIONS 1-9: 0
8. MOVING OR SPEAKING SO SLOWLY THAT OTHER PEOPLE COULD HAVE NOTICED. OR THE OPPOSITE, BEING SO FIGETY OR RESTLESS THAT YOU HAVE BEEN MOVING AROUND A LOT MORE THAN USUAL: NOT AT ALL
6. FEELING BAD ABOUT YOURSELF - OR THAT YOU ARE A FAILURE OR HAVE LET YOURSELF OR YOUR FAMILY DOWN: NOT AT ALL

## 2025-07-21 ENCOUNTER — HOSPITAL ENCOUNTER (OUTPATIENT)
Dept: CARDIAC REHAB | Age: 75
Setting detail: THERAPIES SERIES
Discharge: HOME | End: 2025-07-21
Payer: MEDICARE

## 2025-07-21 PROCEDURE — 93798 PHYS/QHP OP CAR RHAB W/ECG: CPT

## 2025-07-23 ENCOUNTER — APPOINTMENT (OUTPATIENT)
Dept: CARDIAC REHAB | Age: 75
End: 2025-07-23
Payer: MEDICARE

## 2025-07-25 ENCOUNTER — HOSPITAL ENCOUNTER (OUTPATIENT)
Dept: CARDIAC REHAB | Age: 75
Setting detail: THERAPIES SERIES
Discharge: HOME | End: 2025-07-25
Payer: MEDICARE

## 2025-07-25 PROCEDURE — 93798 PHYS/QHP OP CAR RHAB W/ECG: CPT

## 2025-07-28 ENCOUNTER — APPOINTMENT (OUTPATIENT)
Dept: CARDIAC REHAB | Age: 75
End: 2025-07-28
Payer: MEDICARE

## 2025-07-30 ENCOUNTER — APPOINTMENT (OUTPATIENT)
Dept: CARDIAC REHAB | Age: 75
End: 2025-07-30
Payer: MEDICARE

## 2025-08-01 ENCOUNTER — APPOINTMENT (OUTPATIENT)
Dept: CARDIAC REHAB | Age: 75
End: 2025-08-01
Payer: MEDICARE

## 2025-08-04 ENCOUNTER — HOSPITAL ENCOUNTER (OUTPATIENT)
Dept: CARDIAC REHAB | Age: 75
Setting detail: THERAPIES SERIES
Discharge: HOME | End: 2025-08-04
Payer: MEDICARE

## 2025-08-04 PROCEDURE — 93798 PHYS/QHP OP CAR RHAB W/ECG: CPT

## 2025-08-06 ENCOUNTER — HOSPITAL ENCOUNTER (OUTPATIENT)
Dept: CARDIAC REHAB | Age: 75
Setting detail: THERAPIES SERIES
Discharge: HOME | End: 2025-08-06
Payer: MEDICARE

## 2025-08-06 PROCEDURE — 93798 PHYS/QHP OP CAR RHAB W/ECG: CPT

## 2025-08-06 ASSESSMENT — PATIENT HEALTH QUESTIONNAIRE - PHQ9
8. MOVING OR SPEAKING SO SLOWLY THAT OTHER PEOPLE COULD HAVE NOTICED. OR THE OPPOSITE, BEING SO FIGETY OR RESTLESS THAT YOU HAVE BEEN MOVING AROUND A LOT MORE THAN USUAL: NOT AT ALL
SUM OF ALL RESPONSES TO PHQ QUESTIONS 1-9: 0
4. FEELING TIRED OR HAVING LITTLE ENERGY: NOT AT ALL
10. IF YOU CHECKED OFF ANY PROBLEMS, HOW DIFFICULT HAVE THESE PROBLEMS MADE IT FOR YOU TO DO YOUR WORK, TAKE CARE OF THINGS AT HOME, OR GET ALONG WITH OTHER PEOPLE: NOT DIFFICULT AT ALL
SUM OF ALL RESPONSES TO PHQ QUESTIONS 1-9: 0
1. LITTLE INTEREST OR PLEASURE IN DOING THINGS: NOT AT ALL
7. TROUBLE CONCENTRATING ON THINGS, SUCH AS READING THE NEWSPAPER OR WATCHING TELEVISION: NOT AT ALL
5. POOR APPETITE OR OVEREATING: NOT AT ALL
SUM OF ALL RESPONSES TO PHQ QUESTIONS 1-9: 0
6. FEELING BAD ABOUT YOURSELF - OR THAT YOU ARE A FAILURE OR HAVE LET YOURSELF OR YOUR FAMILY DOWN: NOT AT ALL
3. TROUBLE FALLING OR STAYING ASLEEP: NOT AT ALL
2. FEELING DOWN, DEPRESSED OR HOPELESS: NOT AT ALL
9. THOUGHTS THAT YOU WOULD BE BETTER OFF DEAD, OR OF HURTING YOURSELF: NOT AT ALL
SUM OF ALL RESPONSES TO PHQ QUESTIONS 1-9: 0

## 2025-08-06 ASSESSMENT — NEW YORK HEART ASSOCIATION (NYHA) CLASSIFICATION: NYHA FUNCTIONAL CLASS: NO NYHA CLASS OR UNABLE TO DETERMINE

## 2025-08-11 ENCOUNTER — HOSPITAL ENCOUNTER (OUTPATIENT)
Dept: CARDIAC REHAB | Age: 75
Setting detail: THERAPIES SERIES
Discharge: HOME | End: 2025-08-11
Payer: MEDICARE

## 2025-08-11 PROCEDURE — 93798 PHYS/QHP OP CAR RHAB W/ECG: CPT

## 2025-08-15 ENCOUNTER — HOSPITAL ENCOUNTER (OUTPATIENT)
Dept: CARDIAC REHAB | Age: 75
Setting detail: THERAPIES SERIES
Discharge: HOME | End: 2025-08-15
Payer: MEDICARE

## 2025-08-15 PROCEDURE — 93798 PHYS/QHP OP CAR RHAB W/ECG: CPT

## 2025-08-18 ENCOUNTER — HOSPITAL ENCOUNTER (OUTPATIENT)
Dept: CARDIAC REHAB | Age: 75
Setting detail: THERAPIES SERIES
Discharge: HOME | End: 2025-08-18
Payer: MEDICARE

## 2025-08-18 PROCEDURE — 93798 PHYS/QHP OP CAR RHAB W/ECG: CPT

## 2025-08-20 ENCOUNTER — HOSPITAL ENCOUNTER (OUTPATIENT)
Dept: CARDIAC REHAB | Age: 75
Setting detail: THERAPIES SERIES
Discharge: HOME | End: 2025-08-20
Payer: MEDICARE

## 2025-08-20 PROCEDURE — 93798 PHYS/QHP OP CAR RHAB W/ECG: CPT

## 2025-08-22 ENCOUNTER — HOSPITAL ENCOUNTER (OUTPATIENT)
Dept: CARDIAC REHAB | Age: 75
Setting detail: THERAPIES SERIES
Discharge: HOME | End: 2025-08-22
Payer: MEDICARE

## 2025-08-22 LAB
ALBUMIN SERPL-MCNC: 4.2 G/DL (ref 3.5–5.2)
ALP SERPL-CCNC: 58 U/L (ref 40–129)
ALT SERPL-CCNC: 39 U/L (ref 0–50)
ANION GAP SERPL CALCULATED.3IONS-SCNC: 14 MMOL/L (ref 7–16)
AST SERPL-CCNC: 28 U/L (ref 0–50)
BILIRUB SERPL-MCNC: 0.5 MG/DL (ref 0–1.2)
BUN SERPL-MCNC: 17 MG/DL (ref 8–23)
CALCIUM SERPL-MCNC: 9.3 MG/DL (ref 8.8–10.2)
CHLORIDE SERPL-SCNC: 108 MMOL/L (ref 98–107)
CHOLEST SERPL-MCNC: 104 MG/DL
CO2 SERPL-SCNC: 21 MMOL/L (ref 22–29)
CREAT SERPL-MCNC: 1.2 MG/DL (ref 0.7–1.2)
GFR, ESTIMATED: 65 ML/MIN/1.73M2
GLUCOSE SERPL-MCNC: 142 MG/DL (ref 74–99)
HDLC SERPL-MCNC: 35 MG/DL
LDLC SERPL CALC-MCNC: 34 MG/DL
POTASSIUM SERPL-SCNC: 5 MMOL/L (ref 3.5–5.1)
PROT SERPL-MCNC: 7 G/DL (ref 6.4–8.3)
SODIUM SERPL-SCNC: 143 MMOL/L (ref 136–145)
TRIGL SERPL-MCNC: 172 MG/DL
VLDLC SERPL CALC-MCNC: 34 MG/DL

## 2025-08-22 PROCEDURE — 93798 PHYS/QHP OP CAR RHAB W/ECG: CPT

## 2025-08-25 ENCOUNTER — APPOINTMENT (OUTPATIENT)
Dept: CARDIAC REHAB | Age: 75
End: 2025-08-25
Payer: MEDICARE

## 2025-08-27 ENCOUNTER — APPOINTMENT (OUTPATIENT)
Dept: CARDIAC REHAB | Age: 75
End: 2025-08-27
Payer: MEDICARE

## 2025-08-29 ENCOUNTER — APPOINTMENT (OUTPATIENT)
Dept: CARDIAC REHAB | Age: 75
End: 2025-08-29
Payer: MEDICARE

## (undated) DEVICE — RUNTHROUGH NS EXTRA FLOPPY PTCA GUIDEWIRE: Brand: RUNTHROUGH

## (undated) DEVICE — GLOVE SURG SZ 7.5 L11.73IN FNGR THK9.8MIL STRW LTX POLYMER

## (undated) DEVICE — GUIDEWIRE WITH ICE™ HYDROPHILIC COATING: Brand: CHOICE™ PT

## (undated) DEVICE — PERCLOSE™ PROSTYLE™ SUTURE-MEDIATED CLOSURE AND REPAIR SYSTEM: Brand: PERCLOSE™ PROSTYLE™

## (undated) DEVICE — PAD, DEFIB, ADULT, RADIOTRAN, PHYSIO, LO: Brand: MEDLINE

## (undated) DEVICE — CATH BLLN ANGIO 2X20MM NC EUPHORIA RX

## (undated) DEVICE — Device

## (undated) DEVICE — GLIDESHEATH SLENDER NITINOL HYDROPHILIC COATED INTRODUCER SHEATH: Brand: GLIDESHEATH SLENDER

## (undated) DEVICE — CATH BLLN ANGIO 2X30MM SC EUPHORA RX

## (undated) DEVICE — CATHETER DIAG 6FR L110CM PIG CRV SZ 6 SIDE H DBL BRAID WIRE

## (undated) DEVICE — CIL FLEX 20 FLL-MLL - PKG: Brand: NAMIC

## (undated) DEVICE — KIT MED IMAG CNTRST AGNT W/ IOPAMIDOL REUSE

## (undated) DEVICE — CANNULA NSL CANN NSL L25FT TBNG AD O2 SUP SFT UC

## (undated) DEVICE — CATHETER DIAG 6FR L100CM LUMN ID0.056IN JL4 CRV 0 SIDE H

## (undated) DEVICE — CATHETER GUID 6FR GWIRE 0.071IN COR EXTRA BKUP SUPP 3.75

## (undated) DEVICE — KIT MFLD ISOLATN NACL CNTRST PRT TBNG SPIK W/ PRSS TRNSDUC

## (undated) DEVICE — VALVE ANGIO ID0.11IN HEMSTAT MTL GUID WIRE INSRT TOOL AND

## (undated) DEVICE — NEEDLE ANGIO 18GA L7CM 0038IN 1 WALL SELD SHLD UNIQUE HUB

## (undated) DEVICE — CATHETER GUID 5 FRX150 CM 25 CM RX GUIDELINER V3

## (undated) DEVICE — GUIDEWIRE VASC L260CM DIA0.035IN TIP L3MM STD EXCHG PTFE J

## (undated) DEVICE — DEVICE INFL 20ML 30ATM POLYCARB BRL ABS PLUNG DGT DISPLAY

## (undated) DEVICE — PINNACLE INTRODUCER SHEATH: Brand: PINNACLE

## (undated) DEVICE — DRAPE, RADIAL, STERILE: Brand: MEDLINE

## (undated) DEVICE — CATHETER DIAG 6FR L100CM LUMN ID0.056IN JR4 CRV 0 SIDE H